# Patient Record
Sex: FEMALE | Race: WHITE | NOT HISPANIC OR LATINO | Employment: FULL TIME | URBAN - METROPOLITAN AREA
[De-identification: names, ages, dates, MRNs, and addresses within clinical notes are randomized per-mention and may not be internally consistent; named-entity substitution may affect disease eponyms.]

---

## 2019-07-11 ENCOUNTER — OFFICE VISIT (OUTPATIENT)
Dept: PODIATRY | Facility: CLINIC | Age: 49
End: 2019-07-11
Payer: COMMERCIAL

## 2019-07-11 ENCOUNTER — APPOINTMENT (OUTPATIENT)
Dept: RADIOLOGY | Facility: CLINIC | Age: 49
End: 2019-07-11
Payer: COMMERCIAL

## 2019-07-11 VITALS
WEIGHT: 105 LBS | BODY MASS INDEX: 17.93 KG/M2 | SYSTOLIC BLOOD PRESSURE: 110 MMHG | HEIGHT: 64 IN | DIASTOLIC BLOOD PRESSURE: 64 MMHG

## 2019-07-11 DIAGNOSIS — M20.12 ACQUIRED HALLUX VALGUS OF LEFT FOOT: Primary | ICD-10-CM

## 2019-07-11 DIAGNOSIS — M20.12 HALLUX VALGUS (ACQUIRED), LEFT FOOT: Primary | ICD-10-CM

## 2019-07-11 DIAGNOSIS — M20.22 HALLUX RIGIDUS OF LEFT FOOT: ICD-10-CM

## 2019-07-11 DIAGNOSIS — M20.12 HALLUX VALGUS (ACQUIRED), LEFT FOOT: ICD-10-CM

## 2019-07-11 DIAGNOSIS — M77.42 METATARSALGIA OF LEFT FOOT: ICD-10-CM

## 2019-07-11 DIAGNOSIS — T84.84XA PAINFUL ORTHOPAEDIC HARDWARE (HCC): ICD-10-CM

## 2019-07-11 PROCEDURE — 99203 OFFICE O/P NEW LOW 30 MIN: CPT | Performed by: PODIATRIST

## 2019-07-11 PROCEDURE — 73630 X-RAY EXAM OF FOOT: CPT

## 2019-07-11 RX ORDER — PRAMIPEXOLE DIHYDROCHLORIDE 1 MG/1
1 TABLET ORAL
COMMUNITY
Start: 2019-05-20

## 2019-07-11 RX ORDER — VARENICLINE TARTRATE
KIT 2 TIMES DAILY
COMMUNITY
Start: 2019-05-31

## 2019-07-11 RX ORDER — ALBUTEROL SULFATE 90 UG/1
1 AEROSOL, METERED RESPIRATORY (INHALATION) EVERY 4 HOURS PRN
COMMUNITY
Start: 2019-05-31

## 2019-07-11 RX ORDER — GABAPENTIN 300 MG/1
300 CAPSULE ORAL 3 TIMES DAILY
COMMUNITY
Start: 2019-05-20

## 2019-07-11 RX ORDER — FLUCONAZOLE 150 MG/1
TABLET ORAL
COMMUNITY
Start: 2019-05-09 | End: 2019-07-23

## 2019-07-11 NOTE — PROGRESS NOTES
Assessment/Plan:       Diagnoses and all orders for this visit:    Hallux valgus (acquired), left foot  -     X-ray foot left 3+ views; Future    Metatarsalgia of left foot  -     X-ray foot left 3+ views; Future    Other orders  -     albuterol (PROVENTIL HFA,VENTOLIN HFA) 90 mcg/act inhaler  -     fluconazole (DIFLUCAN) 150 mg tablet  -     gabapentin (NEURONTIN) 300 mg capsule  -     pramipexole (MIRAPEX) 1 mg tablet  -     CHANTIX STARTING MONTH ABDIRASHID 0 5 MG X 11 & 1 MG X 42 tablet      Procedures:  1  Fusion 1st TMTJ  2  Removal hardware 5th metatarsal  3  Removal hardware great toe    Patient has chronic left foot pain due to failed bunionectomy many years ago  At this point the great toe is end-stage arthritis and the only realistic option for pain relief would be fusion  I reviewed the patient's x-rays with her showing her this deformity and she is in agreement to proceed with fusion of the 1st metatarsophalangeal joint of the great toe  I will be able to correct the angular deformity of the great toe at this time with the fusion  She does have 2 screws  Retained in her proximal phalanx of her great toe  These will need to be removed at the time of fusion  She also has retained wire on her 5th metatarsal from a bunionette procedure which is irritating and painful  This can be removed at the same time as her procedure  Consent was signed today for fusion of the 1st metatarsophalangeal joint with removal of hardware from the great toe and 5th metatarsal   Alternatives, risks, complications were discussed  All questions were answered  No guarantees were given outcome  Patient will be sent for preop testing clearance  Subjective:      Patient ID: Rick Adjutant is a 51 y o  female  PAtient had previous bunion and bunionette surgery and the pain never resolved  The big toe doesn't bend and is "killing me " She gets shooting pain also on the lateral forefoot  Her great toe is still crooked  She can barely make it through the day  She has tried orthotics without relief  The following portions of the patient's history were reviewed and updated as appropriate: allergies, current medications, past family history, past medical history, past social history, past surgical history and problem list     Review of Systems   Constitutional: Negative for fever  Respiratory: Negative for cough and shortness of breath  Gastrointestinal: Negative for diarrhea and nausea  Musculoskeletal: Positive for arthralgias, gait problem and myalgias  Skin: Negative for wound  Neurological: Negative for numbness  Objective:      /64   Ht 5' 4" (1 626 m)   Wt 47 6 kg (105 lb)   BMI 18 02 kg/m²          Physical Exam   Cardiovascular:   Pulses:       Dorsalis pedis pulses are 2+ on the right side, and 2+ on the left side  Posterior tibial pulses are 2+ on the right side, and 2+ on the left side  Musculoskeletal:        Feet:    Feet:   Right Foot:   Protective Sensation: 10 sites tested  10 sites sensed  Skin Integrity: Negative for ulcer, erythema, warmth or dry skin  Left Foot:   Protective Sensation: 10 sites tested  10 sites sensed  Skin Integrity: Negative for ulcer, erythema, warmth or dry skin           Severe hallux rigidus with hav    PAin over 5th metatarsal with palpable hardware on neck of met

## 2019-07-22 ENCOUNTER — ANESTHESIA EVENT (OUTPATIENT)
Dept: PERIOP | Facility: AMBULARY SURGERY CENTER | Age: 49
End: 2019-07-22
Payer: COMMERCIAL

## 2019-07-23 RX ORDER — DIPHENOXYLATE HYDROCHLORIDE AND ATROPINE SULFATE 2.5; .025 MG/1; MG/1
1 TABLET ORAL DAILY
COMMUNITY

## 2019-07-23 NOTE — PRE-PROCEDURE INSTRUCTIONS
Pre-Surgery Instructions:   Medication Instructions    albuterol (PROVENTIL HFA,VENTOLIN HFA) 90 mcg/act inhaler Instructed patient per Anesthesia Guidelines   CHANTIX STARTING MONTH ABDIRASHID 0 5 MG X 11 & 1 MG X 42 tablet Instructed patient per Anesthesia Guidelines   gabapentin (NEURONTIN) 300 mg capsule Instructed patient per Anesthesia Guidelines   multivitamin (THERAGRAN) TABS Instructed patient per Anesthesia Guidelines   pramipexole (MIRAPEX) 1 mg tablet Instructed patient per Anesthesia Guidelines      Pre op and showering instructions reviewed-Patient getting hibiclens

## 2019-08-01 ENCOUNTER — APPOINTMENT (OUTPATIENT)
Dept: RADIOLOGY | Facility: AMBULARY SURGERY CENTER | Age: 49
End: 2019-08-01
Payer: COMMERCIAL

## 2019-08-01 ENCOUNTER — ANESTHESIA (OUTPATIENT)
Dept: PERIOP | Facility: AMBULARY SURGERY CENTER | Age: 49
End: 2019-08-01
Payer: COMMERCIAL

## 2019-08-01 ENCOUNTER — HOSPITAL ENCOUNTER (OUTPATIENT)
Facility: AMBULARY SURGERY CENTER | Age: 49
Setting detail: OUTPATIENT SURGERY
Discharge: HOME | End: 2019-08-01
Attending: PODIATRIST | Admitting: PODIATRIST
Payer: COMMERCIAL

## 2019-08-01 VITALS
HEART RATE: 80 BPM | BODY MASS INDEX: 17.48 KG/M2 | WEIGHT: 102.38 LBS | OXYGEN SATURATION: 98 % | SYSTOLIC BLOOD PRESSURE: 160 MMHG | TEMPERATURE: 98.5 F | HEIGHT: 64 IN | DIASTOLIC BLOOD PRESSURE: 94 MMHG | RESPIRATION RATE: 20 BRPM

## 2019-08-01 DIAGNOSIS — Z98.890 POST-OPERATIVE STATE: Primary | ICD-10-CM

## 2019-08-01 PROCEDURE — 73620 X-RAY EXAM OF FOOT: CPT

## 2019-08-01 PROCEDURE — 20680 REMOVAL OF IMPLANT DEEP: CPT | Performed by: PODIATRIST

## 2019-08-01 PROCEDURE — C1769 GUIDE WIRE: HCPCS | Performed by: PODIATRIST

## 2019-08-01 PROCEDURE — 28750 FUSION OF BIG TOE JOINT: CPT | Performed by: PODIATRIST

## 2019-08-01 PROCEDURE — C1713 ANCHOR/SCREW BN/BN,TIS/BN: HCPCS | Performed by: PODIATRIST

## 2019-08-01 DEVICE — IMPLANTABLE DEVICE: Type: IMPLANTABLE DEVICE | Site: TOE | Status: FUNCTIONAL

## 2019-08-01 RX ORDER — FENTANYL CITRATE/PF 50 MCG/ML
50 SYRINGE (ML) INJECTION
Status: COMPLETED | OUTPATIENT
Start: 2019-08-01 | End: 2019-08-01

## 2019-08-01 RX ORDER — ONDANSETRON 2 MG/ML
INJECTION INTRAMUSCULAR; INTRAVENOUS AS NEEDED
Status: DISCONTINUED | OUTPATIENT
Start: 2019-08-01 | End: 2019-08-01 | Stop reason: SURG

## 2019-08-01 RX ORDER — SODIUM CHLORIDE, SODIUM LACTATE, POTASSIUM CHLORIDE, CALCIUM CHLORIDE 600; 310; 30; 20 MG/100ML; MG/100ML; MG/100ML; MG/100ML
125 INJECTION, SOLUTION INTRAVENOUS CONTINUOUS
Status: DISCONTINUED | OUTPATIENT
Start: 2019-08-01 | End: 2019-08-01 | Stop reason: HOSPADM

## 2019-08-01 RX ORDER — HYDROMORPHONE HCL/PF 1 MG/ML
0.5 SYRINGE (ML) INJECTION
Status: DISCONTINUED | OUTPATIENT
Start: 2019-08-01 | End: 2019-08-01 | Stop reason: HOSPADM

## 2019-08-01 RX ORDER — HYDROCODONE BITARTRATE AND ACETAMINOPHEN 5; 325 MG/1; MG/1
1 TABLET ORAL EVERY 6 HOURS PRN
Status: DISCONTINUED | OUTPATIENT
Start: 2019-08-01 | End: 2019-08-01 | Stop reason: HOSPADM

## 2019-08-01 RX ORDER — MIDAZOLAM HYDROCHLORIDE 1 MG/ML
INJECTION INTRAMUSCULAR; INTRAVENOUS AS NEEDED
Status: DISCONTINUED | OUTPATIENT
Start: 2019-08-01 | End: 2019-08-01 | Stop reason: SURG

## 2019-08-01 RX ORDER — PROPOFOL 10 MG/ML
INJECTION, EMULSION INTRAVENOUS CONTINUOUS PRN
Status: DISCONTINUED | OUTPATIENT
Start: 2019-08-01 | End: 2019-08-01 | Stop reason: SURG

## 2019-08-01 RX ORDER — GABAPENTIN 300 MG/1
300 CAPSULE ORAL ONCE
Status: COMPLETED | OUTPATIENT
Start: 2019-08-01 | End: 2019-08-01

## 2019-08-01 RX ORDER — SCOLOPAMINE TRANSDERMAL SYSTEM 1 MG/1
PATCH, EXTENDED RELEASE TRANSDERMAL AS NEEDED
Status: DISCONTINUED | OUTPATIENT
Start: 2019-08-01 | End: 2019-08-01 | Stop reason: SURG

## 2019-08-01 RX ORDER — PROMETHAZINE HYDROCHLORIDE 25 MG/ML
25 INJECTION, SOLUTION INTRAMUSCULAR; INTRAVENOUS ONCE AS NEEDED
Status: CANCELLED | OUTPATIENT
Start: 2019-08-01

## 2019-08-01 RX ORDER — ONDANSETRON 2 MG/ML
4 INJECTION INTRAMUSCULAR; INTRAVENOUS ONCE AS NEEDED
Status: COMPLETED | OUTPATIENT
Start: 2019-08-01 | End: 2019-08-01

## 2019-08-01 RX ORDER — PROPOFOL 10 MG/ML
INJECTION, EMULSION INTRAVENOUS AS NEEDED
Status: DISCONTINUED | OUTPATIENT
Start: 2019-08-01 | End: 2019-08-01 | Stop reason: SURG

## 2019-08-01 RX ORDER — MAGNESIUM HYDROXIDE 1200 MG/15ML
LIQUID ORAL AS NEEDED
Status: DISCONTINUED | OUTPATIENT
Start: 2019-08-01 | End: 2019-08-01 | Stop reason: HOSPADM

## 2019-08-01 RX ORDER — LIDOCAINE HYDROCHLORIDE 10 MG/ML
0.5 INJECTION, SOLUTION EPIDURAL; INFILTRATION; INTRACAUDAL; PERINEURAL ONCE AS NEEDED
Status: DISCONTINUED | OUTPATIENT
Start: 2019-08-01 | End: 2019-08-01 | Stop reason: HOSPADM

## 2019-08-01 RX ORDER — OXYCODONE HYDROCHLORIDE AND ACETAMINOPHEN 5; 325 MG/1; MG/1
1 TABLET ORAL EVERY 4 HOURS PRN
Status: DISCONTINUED | OUTPATIENT
Start: 2019-08-01 | End: 2019-08-01

## 2019-08-01 RX ORDER — FENTANYL CITRATE 50 UG/ML
INJECTION, SOLUTION INTRAMUSCULAR; INTRAVENOUS AS NEEDED
Status: DISCONTINUED | OUTPATIENT
Start: 2019-08-01 | End: 2019-08-01 | Stop reason: SURG

## 2019-08-01 RX ORDER — CLINDAMYCIN PHOSPHATE 900 MG/50ML
900 INJECTION INTRAVENOUS ONCE
Status: COMPLETED | OUTPATIENT
Start: 2019-08-01 | End: 2019-08-01

## 2019-08-01 RX ORDER — SODIUM CHLORIDE, SODIUM LACTATE, POTASSIUM CHLORIDE, CALCIUM CHLORIDE 600; 310; 30; 20 MG/100ML; MG/100ML; MG/100ML; MG/100ML
INJECTION, SOLUTION INTRAVENOUS CONTINUOUS PRN
Status: DISCONTINUED | OUTPATIENT
Start: 2019-08-01 | End: 2019-08-01

## 2019-08-01 RX ORDER — KETAMINE HYDROCHLORIDE 50 MG/ML
INJECTION, SOLUTION, CONCENTRATE INTRAMUSCULAR; INTRAVENOUS AS NEEDED
Status: DISCONTINUED | OUTPATIENT
Start: 2019-08-01 | End: 2019-08-01 | Stop reason: SURG

## 2019-08-01 RX ORDER — DEXAMETHASONE SODIUM PHOSPHATE 4 MG/ML
INJECTION, SOLUTION INTRA-ARTICULAR; INTRALESIONAL; INTRAMUSCULAR; INTRAVENOUS; SOFT TISSUE AS NEEDED
Status: DISCONTINUED | OUTPATIENT
Start: 2019-08-01 | End: 2019-08-01 | Stop reason: SURG

## 2019-08-01 RX ORDER — SODIUM CHLORIDE, SODIUM LACTATE, POTASSIUM CHLORIDE, CALCIUM CHLORIDE 600; 310; 30; 20 MG/100ML; MG/100ML; MG/100ML; MG/100ML
100 INJECTION, SOLUTION INTRAVENOUS CONTINUOUS
Status: CANCELLED | OUTPATIENT
Start: 2019-08-01

## 2019-08-01 RX ORDER — DIPHENHYDRAMINE HYDROCHLORIDE 50 MG/ML
12.5 INJECTION INTRAMUSCULAR; INTRAVENOUS ONCE AS NEEDED
Status: DISCONTINUED | OUTPATIENT
Start: 2019-08-01 | End: 2019-08-01 | Stop reason: HOSPADM

## 2019-08-01 RX ORDER — DIPHENHYDRAMINE HYDROCHLORIDE 50 MG/ML
INJECTION INTRAMUSCULAR; INTRAVENOUS AS NEEDED
Status: DISCONTINUED | OUTPATIENT
Start: 2019-08-01 | End: 2019-08-01 | Stop reason: SURG

## 2019-08-01 RX ORDER — OXYCODONE HYDROCHLORIDE AND ACETAMINOPHEN 5; 325 MG/1; MG/1
1 TABLET ORAL EVERY 4 HOURS PRN
Qty: 20 TABLET | Refills: 0 | Status: SHIPPED | OUTPATIENT
Start: 2019-08-01 | End: 2019-08-11

## 2019-08-01 RX ADMIN — KETAMINE HYDROCHLORIDE 10 MG: 50 INJECTION INTRAMUSCULAR; INTRAVENOUS at 15:15

## 2019-08-01 RX ADMIN — PHENYLEPHRINE HYDROCHLORIDE 50 MCG: 10 INJECTION INTRAVENOUS at 15:25

## 2019-08-01 RX ADMIN — KETAMINE HYDROCHLORIDE 25 MG: 50 INJECTION INTRAMUSCULAR; INTRAVENOUS at 14:10

## 2019-08-01 RX ADMIN — DIPHENHYDRAMINE HYDROCHLORIDE 12.5 MG: 50 INJECTION, SOLUTION INTRAMUSCULAR; INTRAVENOUS at 14:21

## 2019-08-01 RX ADMIN — HYDROMORPHONE HYDROCHLORIDE 0.5 MG: 1 INJECTION, SOLUTION INTRAMUSCULAR; INTRAVENOUS; SUBCUTANEOUS at 16:27

## 2019-08-01 RX ADMIN — SODIUM CHLORIDE, SODIUM LACTATE, POTASSIUM CHLORIDE, AND CALCIUM CHLORIDE: .6; .31; .03; .02 INJECTION, SOLUTION INTRAVENOUS at 14:50

## 2019-08-01 RX ADMIN — PHENYLEPHRINE HYDROCHLORIDE 100 MCG: 10 INJECTION INTRAVENOUS at 15:03

## 2019-08-01 RX ADMIN — FENTANYL CITRATE 50 MCG: 50 INJECTION, SOLUTION INTRAMUSCULAR; INTRAVENOUS at 16:07

## 2019-08-01 RX ADMIN — HYDROCODONE BITARTRATE AND ACETAMINOPHEN 1 TABLET: 5; 325 TABLET ORAL at 16:50

## 2019-08-01 RX ADMIN — PHENYLEPHRINE HYDROCHLORIDE 100 MCG: 10 INJECTION INTRAVENOUS at 15:16

## 2019-08-01 RX ADMIN — HYDROMORPHONE HYDROCHLORIDE 0.5 MG: 1 INJECTION, SOLUTION INTRAMUSCULAR; INTRAVENOUS; SUBCUTANEOUS at 16:20

## 2019-08-01 RX ADMIN — FENTANYL CITRATE 50 MCG: 50 INJECTION, SOLUTION INTRAMUSCULAR; INTRAVENOUS at 16:15

## 2019-08-01 RX ADMIN — ONDANSETRON 4 MG: 2 INJECTION INTRAMUSCULAR; INTRAVENOUS at 14:19

## 2019-08-01 RX ADMIN — DEXAMETHASONE SODIUM PHOSPHATE 4 MG: 4 INJECTION, SOLUTION INTRAMUSCULAR; INTRAVENOUS at 14:21

## 2019-08-01 RX ADMIN — ONDANSETRON 4 MG: 2 INJECTION INTRAMUSCULAR; INTRAVENOUS at 16:10

## 2019-08-01 RX ADMIN — PHENYLEPHRINE HYDROCHLORIDE 100 MCG: 10 INJECTION INTRAVENOUS at 15:05

## 2019-08-01 RX ADMIN — GABAPENTIN 300 MG: 300 CAPSULE ORAL at 12:44

## 2019-08-01 RX ADMIN — PROPOFOL 150 MCG/KG/MIN: 10 INJECTION, EMULSION INTRAVENOUS at 14:11

## 2019-08-01 RX ADMIN — SCOPALAMINE 1 PATCH: 1 PATCH, EXTENDED RELEASE TRANSDERMAL at 12:41

## 2019-08-01 RX ADMIN — CLINDAMYCIN PHOSPHATE 900 MG: 18 INJECTION, SOLUTION INTRAMUSCULAR; INTRAVENOUS at 14:11

## 2019-08-01 RX ADMIN — FENTANYL CITRATE 25 MCG: 50 INJECTION, SOLUTION INTRAMUSCULAR; INTRAVENOUS at 14:40

## 2019-08-01 RX ADMIN — MIDAZOLAM HYDROCHLORIDE 2 MG: 1 INJECTION, SOLUTION INTRAMUSCULAR; INTRAVENOUS at 14:03

## 2019-08-01 RX ADMIN — KETAMINE HYDROCHLORIDE 5 MG: 50 INJECTION INTRAMUSCULAR; INTRAVENOUS at 14:57

## 2019-08-01 RX ADMIN — KETAMINE HYDROCHLORIDE 10 MG: 50 INJECTION INTRAMUSCULAR; INTRAVENOUS at 14:37

## 2019-08-01 RX ADMIN — PROPOFOL 200 MG: 10 INJECTION, EMULSION INTRAVENOUS at 14:10

## 2019-08-01 RX ADMIN — SODIUM CHLORIDE, SODIUM LACTATE, POTASSIUM CHLORIDE, AND CALCIUM CHLORIDE: .6; .31; .03; .02 INJECTION, SOLUTION INTRAVENOUS at 12:15

## 2019-08-01 RX ADMIN — PHENYLEPHRINE HYDROCHLORIDE 100 MCG: 10 INJECTION INTRAVENOUS at 14:55

## 2019-08-01 NOTE — ANESTHESIA POSTPROCEDURE EVALUATION
Post-Op Assessment Note    CV Status:  Stable  Pain Score: 0    Pain management: adequate     Mental Status:  Sleepy   Hydration Status:  Stable   PONV Controlled:  Controlled   Airway Patency:  Patent   Post Op Vitals Reviewed: Yes      Staff: CRNA           BP   152/99   Temp   97 9   Pulse  74   Resp   24   SpO2   100

## 2019-08-01 NOTE — ANESTHESIA PREPROCEDURE EVALUATION
Review of Systems/Medical History  Patient summary reviewed  Chart reviewed  History of anesthetic complications (motion sickness) PONV    Cardiovascular  Negative cardio ROS Exercise tolerance (METS): >4,     Pulmonary  Smoker (quit x 1 month) ex-smoker  ,        GI/Hepatic  Negative GI/hepatic ROS          Negative  ROS        Endo/Other  Negative endo/other ROS      GYN    Hysterectomy,        Hematology  Negative hematology ROS      Musculoskeletal    Comment: S/p cervical fusion      Neurology  Negative neurology ROS      Psychology     Chronic pain (RSD LLE - managed with gabapentin, NSAIDs  Weaned herself off opiates 2 years ago),          Physical Exam    Airway    Mallampati score: I  TM Distance: >3 FB  Neck ROM: full     Dental   No notable dental hx     Cardiovascular  Comment: Negative ROS,     Pulmonary      Other Findings    No recent labs    Anesthesia Plan  ASA Score- 2     Anesthesia Type- general with ASA Monitors  Additional Monitors:   Airway Plan: LMA  Comment: Ankle block by surgeon  TIVA for hx severe PONV  Plan Factors-    Induction- intravenous  Postoperative Plan-     Informed Consent- Anesthetic plan and risks discussed with patient and daughter  I personally reviewed this patient with the CRNA  Discussed and agreed on the Anesthesia Plan with the CRNA  Becky Ghosh

## 2019-08-01 NOTE — OP NOTE
OPERATIVE REPORT  PATIENT NAME: Brianne Oliveira    :  1970  MRN: [id number]  Pt Location: [facility] OR [facility]OM 04    SURGERY DATE: 2019    Surgeon(s) and Role:     * Catrachito Kaur DPM - Primary     * Mimi Avalos DPM - Assisting    Preop Diagnosis:  Hallux valgus (acquired), left foot [M20 12]  Painful orthopaedic hardware ([address] ) [T84 84XA]  Hallux rigidus of left foot [M20 22]    Post-Op Diagnosis Codes:     * Hallux valgus (acquired), left foot [M20 12]     * Painful orthopaedic hardware ([address] ) [T84 84XA]     * Hallux rigidus of left foot [M20 22]    PROCEDURE:  1  Arthrodesis Fusion of 1st metatarsophalangeal joint left foot  2  Removal of hardware from phalanx of great toe    Specimen(s):  * No specimens in log *    Estimated Blood Loss:   Minimal    Drains:  * No LDAs found *    Anesthesia Type:   General/LMA with local    Operative Indications:  Hallux valgus (acquired), left foot [M20 12]  Painful orthopaedic hardware ([address] ) [T84 84XA]  Hallux rigidus of left foot [M20 22]    Materials:  Arthrex 20 x 15 mm staple  Arthrex partially-threaded cortical screw  Vicryl and nylon suture    Operative Findings:  Consistent with diagnosis  There is a retained wire in the 5th metatarsal that was buried under extensive amount of bone scarring  In order to remove this piece of wire we would had to take out a significant amount of the metatarsal bone  It was decided at that time that it was unnecessary to remove any incision was closed  As far as the 1st ray both screws that were retained were removed in entirety  The 1st MPJ was fused with good fixation  Complications:   None    Procedure and Technique:  Under mild sedation the patient was brought to the operating room and placed on the operating table in a supine position  A pneumatic ankle tourniquet was placed about the left ankle  Following IV sedation a time-out was performed    Local anesthetic was infiltrated in a 5th ray and 1st ray block about the left foot  The foot was then scrubbed,, draped in the usual aseptic manner  An Esmarch was used to exsanguinate the left foot the tourniquet was inflated to 250 mm of pressure  Attention was directed to the 1st ray where the patient had extensive bony spurring with limited range of motion at the 1st metatarsophalangeal joint  A 6 cm curvilinear incision was made medial to the extensor hallucis longus tendon centered over the 1st metatarsophalangeal joint  Sharp and blunt dissection continued down to the level the capsule which was opened in a linear fashion  All soft tissue capsular attachments reflected off the great toe joint  The retained screws were noted in the medial proximal phalanx  There were removed  Next the 1st metatarsophalangeal joint was inspected  There was no cartilage on either the phalanx and metatarsal head  There was extensive spurring and joint mice  All rough edges were smoothed with a rongeur  Then using a cup and ball Reamer the 1st met head and base of proximal phalanx were prepped  The toe was then put and in corrected position for fusion and a K-wire was placed from the proximal phalanx and the 1st metatarsal   Alignment was confirmed on x-ray  A cortical screw was then placed over the K-wire with good compression noted across the fusion site  Next a 20 x 18 mm Arthrex staple was placed from the phalanx laterally to the metatarsal head medially  Again excellent compression was noted with the staple  C-arm pictures were taken to ensure good alignment and hardware placement  The area was copiously rinsed with normal sterile saline  Deep closure 2 0 Vicryl  Superficial closed with 3 0 Vicryl  Skin was repaired with 4 O nylon  Attention was then directed to the 5th metatarsal neck where retained wire was noted    A 2 cm linear incision was made over this area with dissection carried down to the level of the metatarsal   There was a small amount of visible wire however most of the wire was completely buried within scarred cortical bone  I was unable to remove the wire without extensive removal of metatarsal bone  This time is decided it would cause more trauma to remove the wire rather than just leave it  The area was flushed  Deep closure with 3 0 Vicryl  The skin was repaired with 4 O nylon  Final x-rays were taken  The foot was then cleaned and dressed with Adaptic, 4 x 4 gauze, rolled gauze  The tourniquet was deflated  A prompt hyperemic response was noted to all digits  The patient emerged from anesthesia having tolerated the procedure well  She was transferred to PACU with vital signs stable       I was present for the entire procedure    Patient Disposition:  PACU  and hemodynamically stable    SIGNATURE: Merlin Kenyon DPM  DATE: August 1, 2019  TIME: 3:46 PM

## 2019-08-01 NOTE — OP NOTE
OPERATIVE REPORT - Podiatry  PATIENT NAME: Leticia Isabel    :  1970  MRN: [id number]  Pt Location: [facility]    SURGERY DATE: 2019    Surgeon(s) and Role:     * Chandan Malik DPM - Primary     * Paramjit Fitzgerald DPM - Assisting    Pre-op Diagnosis:  Hallux valgus (acquired), left foot [M20 12]  Painful orthopaedic hardware ([address] ) [T84 84XA]  Hallux rigidus of left foot [M20 22]    Post-Op Diagnosis Codes:     * Hallux valgus (acquired), left foot [M20 12]     * Painful orthopaedic hardware ([address] ) [T84 84XA]     * Hallux rigidus of left foot [M20 22]    Procedure(s) (LRB):  1  LEFT FOOT 1ST MTPJ ARTHRODESIS / FUSION: 69365 (CPT®)  2  LEFT FOOT GREAT TOE REMOVAL OF HARDWARE: 75866 (CPT®)    Specimen(s):  * No specimens in log *    Estimated Blood Loss:   Minimal    Drains:  * No LDAs found *    Anesthesia Type:   General/LMA with a total of 20 cc of 1% lidocaine plain, and 0 5% bupivacaine plain    Hemostasis:  Left ankle pneumatic tourniquet 250 mm per Hg for 80 minutes  Materials:  1x Arthrex 22 mm partially threaded cannulated compression screw  1x Arthrex compression staple  3-0 Vicryl  4-0 Vicryl  4-0 nylon    Operative Findings:  Consistent with diagnosis  Cerclage wire on the 5th metatarsal shaft was unable to be removed after multiple attempts  Complications:   None    Procedure and Technique:     Under mild sedation, the patient was brought into the operating room and placed on the operating room table in the supine position  A pneumatic tourniquet was then placed around the patient's left lower extremity with ample webril padding  A time out was performed to confirm the correct patient, procedure and site with all parties in agreement  Following IV sedation, a Perez, and 5th digit block block was performed consisting of 20 ml of 1% Lidocaine and 0 5% Bupivacaine in a 1:1 mixture  The foot was then scrubbed, prepped and draped in the usual aseptic manner   An esmarch bandage was utilized to exsangunate the patients foot and the tourniquet was then inflated  The esmarch bandage was removed and the foot was placed on the operating room table  Attention was then directed to dorsomedial aspect of the Left foot where an approximately 6cm dorsolinear incision was made  This incision was deepened to subcutaneous tissue with a sterile 15 blade  All vital neurovascular structures were identified and then retracted, all bleeders were identified and cauterized  The EHL was then identified and retracted laterally  A capsular incision was then made and all capsular and periosteal structures were reflected off of the 1st metatarsal head and the base of the proximal phalanx, all soft tissue attachments were reflected off the 1st metatarsal head and base of the proximal phalanx  Upon visualization of the MTPJ, it was noted that there was cartilage destruction to head of 1st metatarsal and prominent osteophyte formation circumferentially around the joint  2x fully threaded screws were removed from the base and shaft of the proximal phalanx and passed off the operative table  This was confirmed utilizing C-arm  A cup and cone reamer was then utilized to remove all cartilaginous surfaces from the head of the first metatarsal and the base of the proximal phalanx down to bleeding subchondral bone in the standard fashion  Adequate reduction and position of the joint was visualized and assured using C-arm  Using standard AO technique a lag screw was placed from distal medial aspect on the proximal phalanx to proximal lateral on the first metatarsal head to achieve compression  The length was confirmed on C-arm  An [employer] compression staple was then placed on the dorsal aspect of the 1st MPJ per manufacture protocol  Adequate reduction of the joint was confirmed radiologically and clinically  The wound was cleansed with copious amounts of sterile saline   Capsular closure was then obtained utilizing 2-0 Vicryl  Subcutaneous tissues were then reapproximated utilizing 3-0 Vicryl  Skin closure was then obtained utilizing 4-0 nylon  Attention was then directed to the 5th metatarsal head where an approximately 2 cm dorsal linear incision was made by the 5th metatarsal neck  The cerclage wire was confirmed utilizing C-arm  However after multiple attempts the cerclage wire was unable to be removed  It was at this time that it was decided to leave the cerclage wire in place  The wound was cleansed with copious amounts of sterile saline  Capsular closure was then obtained utilizing 2-0 Vicryl  Subcutaneous tissues were then reapproximated utilizing 3-0 Vicryl  Skin closure was then obtained utilizing 4-0 nylon  The foot was then cleansed and dried  The incision site was dressed with Xeroform, dry sterile dressing  This was then covered with a Kerlix and an ACE wrap  The tourniquet was deflated and normal hyperemic flush was noted to all digits  The patient tolerated the procedure and anesthesia well and was transported to the PACU with vital signs stable  Dr Luiz Torrez was present during the entire procedure and participated in all key aspects  SIGNATURE: Samuel Cowart DPM  DATE: August 1, 2019  TIME: 3:44 PM      Portions of the record may have been created with voice recognition software  Occasional wrong word or "sound a like" substitutions may have occurred due to the inherent limitations of voice recognition software  Read the chart carefully and recognize, using context, where substitutions have occurred

## 2019-08-01 NOTE — DISCHARGE SUMMARY
Discharge Summary Outpatient Procedure Podiatry -   Lynann Alpers 51 y o  female MRN: [id number]  Unit/Bed#: OR POOL Encounter: [id number]    Admission Date: 8/1/2019     Admitting Diagnosis: Hallux valgus (acquired), left foot [M20 12]  Painful orthopaedic hardware [facility]) [T84 84XA]  Hallux rigidus of left foot [M20 22]    Discharge Diagnosis: same    Procedures Performed:   1  LEFT FOOT 1ST MTPJ ARTHRODESIS / FUSION: 99649 (CPT®)  2  LEFT FOOT GREAT TOE REMOVAL OF HARDWARE: 69507 (CPT®)    Complications: none    Condition at Discharge: stable    Discharge instructions/Information to patient and family:   See after visit summary for information provided to patient and family  Provisions for Follow-Up Care/Important appointments:  See after visit summary for information related to follow-up care and any pertinent home health orders  Discharge Medications:  See after visit summary for reconciled discharge medications provided to patient and family

## 2019-08-01 NOTE — PROGRESS NOTES
Consulted w/ dr gee, anesthesia, pt states pain tolerable a a 5 , prefers discharge from recovery & being with family & taking oral pain medication

## 2019-08-06 ENCOUNTER — OFFICE VISIT (OUTPATIENT)
Dept: PODIATRY | Facility: CLINIC | Age: 49
End: 2019-08-06

## 2019-08-06 VITALS
HEIGHT: 64 IN | SYSTOLIC BLOOD PRESSURE: 150 MMHG | WEIGHT: 102 LBS | BODY MASS INDEX: 17.42 KG/M2 | DIASTOLIC BLOOD PRESSURE: 84 MMHG

## 2019-08-06 DIAGNOSIS — T84.84XA PAINFUL ORTHOPAEDIC HARDWARE (HCC): Primary | ICD-10-CM

## 2019-08-06 DIAGNOSIS — M20.22 HALLUX RIGIDUS OF LEFT FOOT: ICD-10-CM

## 2019-08-06 PROCEDURE — 99024 POSTOP FOLLOW-UP VISIT: CPT | Performed by: PODIATRIST

## 2019-08-10 ENCOUNTER — HOSPITAL ENCOUNTER (EMERGENCY)
Facility: HOSPITAL | Age: 49
Discharge: HOME | End: 2019-08-10
Attending: EMERGENCY MEDICINE | Admitting: EMERGENCY MEDICINE
Payer: COMMERCIAL

## 2019-08-10 VITALS
OXYGEN SATURATION: 100 % | TEMPERATURE: 99.4 F | HEIGHT: 64 IN | DIASTOLIC BLOOD PRESSURE: 79 MMHG | RESPIRATION RATE: 15 BRPM | HEART RATE: 74 BPM | SYSTOLIC BLOOD PRESSURE: 127 MMHG | BODY MASS INDEX: 17.75 KG/M2 | WEIGHT: 104 LBS

## 2019-08-10 DIAGNOSIS — T81.40XA POST-OPERATIVE INFECTION: Primary | ICD-10-CM

## 2019-08-10 PROCEDURE — 99283 EMERGENCY DEPT VISIT LOW MDM: CPT

## 2019-08-10 PROCEDURE — 99284 EMERGENCY DEPT VISIT MOD MDM: CPT | Performed by: PHYSICIAN ASSISTANT

## 2019-08-10 NOTE — ED PROVIDER NOTES
History  Chief Complaint   Patient presents with    Foot Injury     Pt states that she had bunionectomy   she changed her dressing yesterday and is concerned that her toe is stinging, hurting and oozing  Negative fevers     50 yo female presents to the Emergency Department for evaluation of L foot pain and discharge  She is 9d s/p bunionectomy, performed by [facility] Dr Elizabeth Cluster  States that over the past 1-2 days the site became increasingly painful, whereas post operative pain had previously resolved  Noted scant white discharge on dressing this AM  No fevers, chills or sweats  Non ambulatory secondary to procedure  Prior to Admission Medications   Prescriptions Last Dose Informant Patient Reported? Taking?    CHANTIX STARTING MONTH ABDIRASHID 0 5 MG X 11 & 1 MG X 42 tablet   Yes No   Sig: Take by mouth 2 (two) times a day    albuterol (PROVENTIL HFA,VENTOLIN HFA) 90 mcg/act inhaler   Yes No   Sig: Inhale 1 puff every 4 (four) hours as needed    gabapentin (NEURONTIN) 300 mg capsule   Yes No   Sig: Take 300 mg by mouth 3 (three) times a day    multivitamin (THERAGRAN) TABS   Yes No   Sig: Take 1 tablet by mouth daily   oxyCODONE-acetaminophen (PERCOCET) 5-325 mg per tablet Not Taking at Unknown time  No No   Sig: Take 1 tablet by mouth every 4 (four) hours as needed for moderate pain for up to 10 daysMax Daily Amount: 6 tablets   Patient not taking: Reported on 8/10/2019   pramipexole (MIRAPEX) 1 mg tablet   Yes No   Sig: Take 1 mg by mouth daily at bedtime       Facility-Administered Medications: None       Past Medical History:   Diagnosis Date    Bronchitis     Neuropathy     PONV (postoperative nausea and vomiting)     Restless leg syndrome        Past Surgical History:   Procedure Laterality Date    BACK SURGERY      Lumbar X2    CERVICAL FUSION       SECTION      X1    CHOLECYSTECTOMY      COLONOSCOPY      CYST REMOVAL      Scalp, lorna middle fingers    FOOT SURGERY Left     X2    HYSTERECTOMY      NH FUSION BIG TOE,MT-P JT Left 2019    Procedure: FOOT 1ST MTPJ ARTHRODESIS / FUSION;  Surgeon: Lalo Castrejon DPM;  [facility];  Service: Podiatry    NH REMOVAL DEEP IMPLANT Left 2019    Procedure: FOOT GREAT TOE REMOVAL OF HARDWARE;  Surgeon: Lalo Castrejon DPM;  [facility];  Service: Podiatry    TONSILLECTOMY      and adeniodectomy       History reviewed  No pertinent family history  I have reviewed and agree with the history as documented  Social History     Tobacco Use    Smoking status: Former Smoker     Packs/day:      Years: 30      Pack years: 30      Last attempt to quit: 2019     Years since quittin 0    Smokeless tobacco: Never Used    Tobacco comment: Quitting-taking Chantix   Substance Use Topics    Alcohol use: Yes     Frequency: 2-4 times a month     Drinks per session: 1 or 2     Binge frequency: Never     Comment: Socially    Drug use: Never        Review of Systems   Constitutional: Negative for fever  Musculoskeletal: Positive for arthralgias  Negative for joint swelling and myalgias  Skin: Positive for color change and wound  Neurological: Negative for weakness and numbness  All other systems reviewed and are negative  Physical Exam  Physical Exam   Constitutional: She appears well-developed and well-nourished  No distress  HENT:   Head: Normocephalic and atraumatic  Eyes: Pupils are equal, round, and reactive to light  No scleral icterus  Cardiovascular: Normal rate and regular rhythm  Exam reveals no gallop and no friction rub  No murmur heard  Pulmonary/Chest: No respiratory distress  She has no wheezes  She has no rales  Musculoskeletal:        Feet:    Skin: Skin is dry  Capillary refill takes less than 2 seconds  She is not diaphoretic  Psychiatric: She has a normal mood and affect  Her behavior is normal    Vitals reviewed        Vital Signs  ED Triage Vitals   Temperature Pulse Respirations Blood Pressure SpO2   08/10/19 1445 08/10/19 1445 08/10/19 1445 08/10/19 1445 08/10/19 1445   99 4 °F (37 4 °C) 76 17 141/85 98 %      Temp Source Heart Rate Source Patient Position - Orthostatic VS BP Location FiO2 (%)   08/10/19 1445 08/10/19 1500 08/10/19 1445 08/10/19 1445 --   Temporal Monitor Sitting Right arm       Pain Score       08/10/19 1445       7           Vitals:    08/10/19 1445 08/10/19 1500   BP: 141/85 127/79   Pulse: 76 74   Patient Position - Orthostatic VS: Sitting Lying         Visual Acuity      ED Medications  Medications - No data to display    Diagnostic Studies  Results Reviewed     None                 No orders to display              Procedures  Procedures       ED Course                               MDM  Number of Diagnoses or Management Options  Post-operative infection: new and does not require workup  Diagnosis management comments: Surgical site appears relatively clean and dry, however with sudden increase in pain and mild discharge, will start abx to prevent surgical site infection       Amount and/or Complexity of Data Reviewed  Review and summarize past medical records: yes        Disposition  Final diagnoses:   Post-operative infection     Time reflects when diagnosis was documented in both MDM as applicable and the Disposition within this note     Time User Action Codes Description Comment    8/10/2019  3:07 PM Thiago Zuleta Add [T81 [id number]] Post-operative infection       ED Disposition     ED Disposition Condition Date/Time Comment    Discharge Stable Sat Aug 10, 2019  3:07 PM Jh Gill discharge to home/self care              Follow-up Information     Follow up With Specialties Details Why Contact Info    Yadira Gomez, [facility], [address]  [facility] [id number] [facility] [address]            Discharge Medication List as of 8/10/2019  3:09 PM      START taking these medications    Details dicloxacillin (DYNAPEN) 500 MG capsule Take 1 capsule (500 mg total) by mouth 3 (three) times a day for 7 days, Starting Sat 8/10/2019, Until Sat 8/17/2019, Print         CONTINUE these medications which have NOT CHANGED    Details   albuterol (PROVENTIL HFA,VENTOLIN HFA) 90 mcg/act inhaler Inhale 1 puff every 4 (four) hours as needed , Starting Fri 5/31/2019, Historical Med      CHANTIX STARTING MONTH ABDIRASHID 0 5 MG X 11 & 1 MG X 42 tablet Take by mouth 2 (two) times a day , Starting Fri 5/31/2019, Historical Med      gabapentin (NEURONTIN) 300 mg capsule Take 300 mg by mouth 3 (three) times a day , Starting Mon 5/20/2019, Historical Med      multivitamin (THERAGRAN) TABS Take 1 tablet by mouth daily, Historical Med      oxyCODONE-acetaminophen (PERCOCET) 5-325 mg per tablet Take 1 tablet by mouth every 4 (four) hours as needed for moderate pain for up to 10 daysMax Daily Amount: 6 tablets, Starting Thu 8/1/2019, Until Sun 8/11/2019, Print      pramipexole (MIRAPEX) 1 mg tablet Take 1 mg by mouth daily at bedtime , Starting Mon 5/20/2019, Historical Med           No discharge procedures on file      ED Provider  Electronically Signed by           Darlyn Solomon PA-C  08/11/19 8909

## 2019-08-15 ENCOUNTER — OFFICE VISIT (OUTPATIENT)
Dept: PODIATRY | Facility: CLINIC | Age: 49
End: 2019-08-15

## 2019-08-15 VITALS
BODY MASS INDEX: 17.75 KG/M2 | HEIGHT: 64 IN | WEIGHT: 104 LBS | DIASTOLIC BLOOD PRESSURE: 64 MMHG | SYSTOLIC BLOOD PRESSURE: 126 MMHG

## 2019-08-15 DIAGNOSIS — T81.40XA POSTOPERATIVE INFECTION, INITIAL ENCOUNTER: ICD-10-CM

## 2019-08-15 DIAGNOSIS — M20.22 HALLUX RIGIDUS OF LEFT FOOT: Primary | ICD-10-CM

## 2019-08-15 PROCEDURE — 99024 POSTOP FOLLOW-UP VISIT: CPT | Performed by: PODIATRIST

## 2019-08-15 NOTE — PROGRESS NOTES
POST-OP VISIT    Jaene Kyle  1970      Subjective: Patient here for post-op appointment following Fusion of her left great toe joint  Last Friday patient's incision started to turn red and swell  She had a low-grade fever went to the ED on Saturday  She was put on an oral antibiotic  The incision is looking better  It did cause an initial increase in pain  Overall she is feeling well  Objective: The patient appears in NAD / non-toxic  Primary dressing and splint/cast taken down for wound inspection  VSS  No signs of infection  No active drainage  Normal post-op edema  No necrosis, dehiscence  Mild superficial dehiscence but no exposed hardware or bone  No pus  No cellulitis  Assessment/Plan:     Diagnoses and all orders for this visit:    Hallux rigidus of left foot    Postoperative infection, initial encounter        1  Patient is stable post-op  2  Discussed compliance with weight bearing instructions, incision care, and rest  Call if any increase in pain, fevers, calf pain, shortness of breath, or general distress is noted  Patient instructed to go to ER if call is not returned immediately  3  Infection seems controlled  Finish antibiotic as written  Check 1 week  Sutures removed, applied steristrips  Keep incision covered  Betadyne daily to incision

## 2019-08-22 ENCOUNTER — HOSPITAL ENCOUNTER (INPATIENT)
Facility: HOSPITAL | Age: 49
LOS: 3 days | Discharge: HOME | DRG: 603 | End: 2019-08-25
Attending: PODIATRIST | Admitting: PODIATRIST
Payer: COMMERCIAL

## 2019-08-22 ENCOUNTER — APPOINTMENT (INPATIENT)
Dept: RADIOLOGY | Facility: HOSPITAL | Age: 49
DRG: 603 | End: 2019-08-22
Payer: COMMERCIAL

## 2019-08-22 ENCOUNTER — OFFICE VISIT (OUTPATIENT)
Dept: PODIATRY | Facility: CLINIC | Age: 49
End: 2019-08-22

## 2019-08-22 VITALS
HEIGHT: 64 IN | SYSTOLIC BLOOD PRESSURE: 125 MMHG | BODY MASS INDEX: 17.75 KG/M2 | WEIGHT: 104 LBS | DIASTOLIC BLOOD PRESSURE: 63 MMHG

## 2019-08-22 DIAGNOSIS — T81.31XA DISRUPTION OF EXTERNAL SURGICAL WOUND, INITIAL ENCOUNTER: ICD-10-CM

## 2019-08-22 DIAGNOSIS — L03.119 CELLULITIS AND ABSCESS OF FOOT: Primary | ICD-10-CM

## 2019-08-22 DIAGNOSIS — T81.41XS INFECTION OF SUPERFICIAL INCISIONAL SURGICAL SITE AFTER PROCEDURE, SEQUELA: ICD-10-CM

## 2019-08-22 DIAGNOSIS — R11.0 NAUSEA: ICD-10-CM

## 2019-08-22 DIAGNOSIS — Z01.818 PREOPERATIVE CLEARANCE: Primary | ICD-10-CM

## 2019-08-22 DIAGNOSIS — L02.619 CELLULITIS AND ABSCESS OF FOOT: Primary | ICD-10-CM

## 2019-08-22 PROBLEM — T81.41XA INFECTION OF SUPERFICIAL INCISIONAL SURGICAL SITE AFTER PROCEDURE: Status: ACTIVE | Noted: 2019-08-22

## 2019-08-22 LAB
ALBUMIN SERPL BCP-MCNC: 3.7 G/DL (ref 3.5–5)
ALP SERPL-CCNC: 71 U/L (ref 46–116)
ALT SERPL W P-5'-P-CCNC: 24 U/L (ref 12–78)
ANION GAP SERPL CALCULATED.3IONS-SCNC: 8 MMOL/L (ref 4–13)
AST SERPL W P-5'-P-CCNC: 30 U/L (ref 5–45)
ATRIAL RATE: 65 BPM
ATRIAL RATE: 66 BPM
BASOPHILS # BLD AUTO: 0.06 THOUSANDS/ΜL (ref 0–0.1)
BASOPHILS NFR BLD AUTO: 1 % (ref 0–1)
BILIRUB SERPL-MCNC: 0.32 MG/DL (ref 0.2–1)
BUN SERPL-MCNC: 14 MG/DL (ref 5–25)
CALCIUM SERPL-MCNC: 9.2 MG/DL (ref 8.3–10.1)
CHLORIDE SERPL-SCNC: 106 MMOL/L (ref 100–108)
CO2 SERPL-SCNC: 25 MMOL/L (ref 21–32)
CREAT SERPL-MCNC: 0.66 MG/DL (ref 0.6–1.3)
EOSINOPHIL # BLD AUTO: 0.29 THOUSAND/ΜL (ref 0–0.61)
EOSINOPHIL NFR BLD AUTO: 4 % (ref 0–6)
ERYTHROCYTE [DISTWIDTH] IN BLOOD BY AUTOMATED COUNT: 12 % (ref 11.6–15.1)
GFR SERPL CREATININE-BSD FRML MDRD: 104 ML/MIN/1.73SQ M
GLUCOSE SERPL-MCNC: 85 MG/DL (ref 65–140)
HCG SERPL QL: NEGATIVE
HCT VFR BLD AUTO: 45.8 % (ref 34.8–46.1)
HGB BLD-MCNC: 14.5 G/DL (ref 11.5–15.4)
IMM GRANULOCYTES # BLD AUTO: 0.01 THOUSAND/UL (ref 0–0.2)
IMM GRANULOCYTES NFR BLD AUTO: 0 % (ref 0–2)
LYMPHOCYTES # BLD AUTO: 2.42 THOUSANDS/ΜL (ref 0.6–4.47)
LYMPHOCYTES NFR BLD AUTO: 34 % (ref 14–44)
MCH RBC QN AUTO: 31.3 PG (ref 26.8–34.3)
MCHC RBC AUTO-ENTMCNC: 31.7 G/DL (ref 31.4–37.4)
MCV RBC AUTO: 99 FL (ref 82–98)
MONOCYTES # BLD AUTO: 0.45 THOUSAND/ΜL (ref 0.17–1.22)
MONOCYTES NFR BLD AUTO: 6 % (ref 4–12)
NEUTROPHILS # BLD AUTO: 3.81 THOUSANDS/ΜL (ref 1.85–7.62)
NEUTS SEG NFR BLD AUTO: 55 % (ref 43–75)
NRBC BLD AUTO-RTO: 0 /100 WBCS
P AXIS: 68 DEGREES
P AXIS: 94 DEGREES
PLATELET # BLD AUTO: 290 THOUSANDS/UL (ref 149–390)
PMV BLD AUTO: 9.7 FL (ref 8.9–12.7)
POTASSIUM SERPL-SCNC: 4.9 MMOL/L (ref 3.5–5.3)
PR INTERVAL: 136 MS
PR INTERVAL: 150 MS
PROT SERPL-MCNC: 7.1 G/DL (ref 6.4–8.2)
QRS AXIS: 71 DEGREES
QRS AXIS: 79 DEGREES
QRSD INTERVAL: 68 MS
QRSD INTERVAL: 70 MS
QT INTERVAL: 400 MS
QT INTERVAL: 406 MS
QTC INTERVAL: 416 MS
QTC INTERVAL: 425 MS
RBC # BLD AUTO: 4.63 MILLION/UL (ref 3.81–5.12)
SODIUM SERPL-SCNC: 139 MMOL/L (ref 136–145)
T WAVE AXIS: 73 DEGREES
T WAVE AXIS: 79 DEGREES
VENTRICULAR RATE: 65 BPM
VENTRICULAR RATE: 66 BPM
WBC # BLD AUTO: 7.04 THOUSAND/UL (ref 4.31–10.16)

## 2019-08-22 PROCEDURE — 85025 COMPLETE CBC W/AUTO DIFF WBC: CPT | Performed by: STUDENT IN AN ORGANIZED HEALTH CARE EDUCATION/TRAINING PROGRAM

## 2019-08-22 PROCEDURE — 87040 BLOOD CULTURE FOR BACTERIA: CPT | Performed by: STUDENT IN AN ORGANIZED HEALTH CARE EDUCATION/TRAINING PROGRAM

## 2019-08-22 PROCEDURE — 73630 X-RAY EXAM OF FOOT: CPT

## 2019-08-22 PROCEDURE — 93010 ELECTROCARDIOGRAM REPORT: CPT | Performed by: INTERNAL MEDICINE

## 2019-08-22 PROCEDURE — 99024 POSTOP FOLLOW-UP VISIT: CPT | Performed by: PODIATRIST

## 2019-08-22 PROCEDURE — 87077 CULTURE AEROBIC IDENTIFY: CPT | Performed by: STUDENT IN AN ORGANIZED HEALTH CARE EDUCATION/TRAINING PROGRAM

## 2019-08-22 PROCEDURE — 84703 CHORIONIC GONADOTROPIN ASSAY: CPT | Performed by: STUDENT IN AN ORGANIZED HEALTH CARE EDUCATION/TRAINING PROGRAM

## 2019-08-22 PROCEDURE — 87070 CULTURE OTHR SPECIMN AEROBIC: CPT | Performed by: STUDENT IN AN ORGANIZED HEALTH CARE EDUCATION/TRAINING PROGRAM

## 2019-08-22 PROCEDURE — 87186 SC STD MICRODIL/AGAR DIL: CPT | Performed by: STUDENT IN AN ORGANIZED HEALTH CARE EDUCATION/TRAINING PROGRAM

## 2019-08-22 PROCEDURE — 93005 ELECTROCARDIOGRAM TRACING: CPT

## 2019-08-22 PROCEDURE — 71046 X-RAY EXAM CHEST 2 VIEWS: CPT

## 2019-08-22 PROCEDURE — 80053 COMPREHEN METABOLIC PANEL: CPT | Performed by: STUDENT IN AN ORGANIZED HEALTH CARE EDUCATION/TRAINING PROGRAM

## 2019-08-22 PROCEDURE — 87205 SMEAR GRAM STAIN: CPT | Performed by: STUDENT IN AN ORGANIZED HEALTH CARE EDUCATION/TRAINING PROGRAM

## 2019-08-22 RX ORDER — PRAMIPEXOLE DIHYDROCHLORIDE 1 MG/1
1 TABLET ORAL
Status: DISCONTINUED | OUTPATIENT
Start: 2019-08-22 | End: 2019-08-25 | Stop reason: HOSPADM

## 2019-08-22 RX ORDER — POLYETHYLENE GLYCOL 3350 17 G/17G
17 POWDER, FOR SOLUTION ORAL DAILY PRN
Status: DISCONTINUED | OUTPATIENT
Start: 2019-08-22 | End: 2019-08-25 | Stop reason: HOSPADM

## 2019-08-22 RX ORDER — DIPHENHYDRAMINE HCL 25 MG
25 TABLET ORAL EVERY 6 HOURS PRN
Status: DISCONTINUED | OUTPATIENT
Start: 2019-08-22 | End: 2019-08-25 | Stop reason: HOSPADM

## 2019-08-22 RX ORDER — GABAPENTIN 300 MG/1
300 CAPSULE ORAL 3 TIMES DAILY
Status: DISCONTINUED | OUTPATIENT
Start: 2019-08-22 | End: 2019-08-25 | Stop reason: HOSPADM

## 2019-08-22 RX ORDER — VARENICLINE TARTRATE 0.5 MG/1
1 TABLET, FILM COATED ORAL 2 TIMES DAILY
Status: DISCONTINUED | OUTPATIENT
Start: 2019-08-22 | End: 2019-08-25 | Stop reason: HOSPADM

## 2019-08-22 RX ORDER — ONDANSETRON 2 MG/ML
4 INJECTION INTRAMUSCULAR; INTRAVENOUS EVERY 6 HOURS PRN
Status: DISCONTINUED | OUTPATIENT
Start: 2019-08-22 | End: 2019-08-25 | Stop reason: HOSPADM

## 2019-08-22 RX ORDER — METRONIDAZOLE 500 MG/1
500 TABLET ORAL EVERY 8 HOURS SCHEDULED
Status: DISCONTINUED | OUTPATIENT
Start: 2019-08-22 | End: 2019-08-24

## 2019-08-22 RX ORDER — HYDROCODONE BITARTRATE AND ACETAMINOPHEN 5; 325 MG/1; MG/1
1 TABLET ORAL EVERY 6 HOURS PRN
Status: DISCONTINUED | OUTPATIENT
Start: 2019-08-22 | End: 2019-08-25 | Stop reason: HOSPADM

## 2019-08-22 RX ORDER — ACETAMINOPHEN 325 MG/1
650 TABLET ORAL EVERY 6 HOURS PRN
Status: DISCONTINUED | OUTPATIENT
Start: 2019-08-22 | End: 2019-08-25 | Stop reason: HOSPADM

## 2019-08-22 RX ORDER — CLINDAMYCIN PHOSPHATE 600 MG/50ML
600 INJECTION INTRAVENOUS EVERY 8 HOURS
Status: DISCONTINUED | OUTPATIENT
Start: 2019-08-22 | End: 2019-08-25

## 2019-08-22 RX ORDER — ALBUTEROL SULFATE 90 UG/1
1 AEROSOL, METERED RESPIRATORY (INHALATION) EVERY 4 HOURS PRN
Status: DISCONTINUED | OUTPATIENT
Start: 2019-08-22 | End: 2019-08-25 | Stop reason: HOSPADM

## 2019-08-22 RX ADMIN — ALBUTEROL SULFATE 1 PUFF: 90 AEROSOL, METERED RESPIRATORY (INHALATION) at 23:36

## 2019-08-22 RX ADMIN — METRONIDAZOLE 500 MG: 500 TABLET, FILM COATED ORAL at 22:00

## 2019-08-22 RX ADMIN — METRONIDAZOLE 500 MG: 500 TABLET, FILM COATED ORAL at 14:12

## 2019-08-22 RX ADMIN — PRAMIPEXOLE DIHYDROCHLORIDE 1 MG: 1 TABLET ORAL at 22:00

## 2019-08-22 RX ADMIN — HYDROCODONE BITARTRATE AND ACETAMINOPHEN 1 TABLET: 5; 325 TABLET ORAL at 20:00

## 2019-08-22 RX ADMIN — ONDANSETRON 4 MG: 2 INJECTION INTRAMUSCULAR; INTRAVENOUS at 23:22

## 2019-08-22 RX ADMIN — CLINDAMYCIN PHOSPHATE 600 MG: 600 INJECTION, SOLUTION INTRAVENOUS at 20:00

## 2019-08-22 RX ADMIN — GABAPENTIN 300 MG: 300 CAPSULE ORAL at 20:00

## 2019-08-22 RX ADMIN — VARENICLINE TARTRATE 1 MG: 0.5 TABLET, FILM COATED ORAL at 14:23

## 2019-08-22 RX ADMIN — GABAPENTIN 300 MG: 300 CAPSULE ORAL at 17:00

## 2019-08-22 RX ADMIN — CLINDAMYCIN PHOSPHATE 600 MG: 600 INJECTION, SOLUTION INTRAVENOUS at 14:12

## 2019-08-22 RX ADMIN — HYDROCODONE BITARTRATE AND ACETAMINOPHEN 1 TABLET: 5; 325 TABLET ORAL at 14:10

## 2019-08-22 RX ADMIN — DIPHENHYDRAMINE HCL 25 MG: 25 TABLET ORAL at 23:36

## 2019-08-22 RX ADMIN — ENOXAPARIN SODIUM 40 MG: 40 INJECTION SUBCUTANEOUS at 14:11

## 2019-08-22 RX ADMIN — VARENICLINE TARTRATE 1 MG: 0.5 TABLET, FILM COATED ORAL at 17:52

## 2019-08-22 NOTE — PROGRESS NOTES
PAtient returns for postop infection  She was taking doxycycline  It is still draining and dehisced  She reports low grade fever over 99  In office I get a temperature of 99 2°  Patient feels feverish and has chills  I am admitting her to the hospital for IV antibiotics, XRay, possible washout

## 2019-08-22 NOTE — H&P
H&P Exam - Ling Sloan 51 y o  female MRN: [id number]    Unit/Bed#: E5 -01 Encounter: [id number]      Assessment/Plan     Assessment:  1  Left foot post-operative infection  - s/p Left foot removal of hardware from phalanx of great toe, 1st mtpj arthrodesis and lateral foot attempted [facility] (DOS 8/1/19)  2  RLS  3  Previous smoker, currently on chantix    Plan:  - Admit for antibiotics (IV clindamycin and po flagyl) due to failure of outpt doxy  Pending improvement, patient may need OR washout, appreciate IM surgical clearance  Patient to require greater than 2 midnight stay  - CBC, CMP, Wcx, Bcx pending  - L foot XR pending  - IM consult for pre-op clearance  - NWB LLE  - Lovenox vte ppx  - c/w home medications    History of Present Illness     HPI:  Ling Sloan is a 51 y o  female w/ PMH significant for RLS on gabapentin, previous smoker on chantix who presents with Left foot psot-operative pain and infection  Patient had left foot hallux MYRA, 1st mtpj arthrodesis and lateral foot attempted [facility] (DOS 8/1/19) performed by Dr Safia Dai  Patient was seen 8/6/19 for first post-op visit with no issues  She presented 8/10/19 to [facility] ER and was sent home with po doxy  Patient was again seen 8/15/19 in office for 2nd post-op visit, no SOI at that time and sutures were removed  Patient was again seen this morning (8/22) in office, with "a drop of purulence" at both incision sites and note eschar/wounds, patient was also febrile at 99 2°  Patient was then admitted to [facility] for IV abx due to failure of outpt po abx  Patient endorses having low grade fever over the past week and feeling "sick " She endorses nausea, upset stomach, fevers, chills  Patient denies vomiting, chest pain, shortness of breath  PCP: Sarina Kohli MD    Review of Systems   Constitutional: Positive for activity change, chills, fatigue and fever  HENT: Negative  Eyes: Negative for photophobia and visual disturbance     Respiratory: Negative for cough, chest tightness and shortness of breath  Cardiovascular: Negative for chest pain and leg swelling  Gastrointestinal: Positive for abdominal pain and nausea  Negative for diarrhea and vomiting  Endocrine: Negative  Genitourinary: Negative  Musculoskeletal: Negative for arthralgias  Left foot pain   Skin: Positive for color change and wound  Allergic/Immunologic: Negative  Neurological: Negative  Psychiatric/Behavioral: Negative  Historical Information   Past Medical History:   Diagnosis Date    Bronchitis     Neuropathy     PONV (postoperative nausea and vomiting)     Restless leg syndrome      Past Surgical History:   Procedure Laterality Date    BACK SURGERY      Lumbar X2    CERVICAL FUSION       SECTION      X1    CHOLECYSTECTOMY      COLONOSCOPY      CYST REMOVAL      Scalp, lorna middle fingers    FOOT SURGERY Left     X2    HYSTERECTOMY      CA FUSION BIG TOE,MT-P JT Left 2019    Procedure: FOOT 1ST MTPJ ARTHRODESIS / FUSION;  Surgeon: Sheree Dumas DPM;  [facility];  Service: Podiatry    CA REMOVAL DEEP IMPLANT Left 2019    Procedure: FOOT GREAT TOE REMOVAL OF HARDWARE;  Surgeon: Sheree Dumas DPM;  [facility];  Service: Podiatry    TONSILLECTOMY      and adeniodectomy     Social History   Social History     Substance and Sexual Activity   Alcohol Use Yes    Frequency: 2-4 times a month    Drinks per session: 1 or 2    Binge frequency: Never    Comment: Socially     Social History     Substance and Sexual Activity   Drug Use Never     Social History     Tobacco Use   Smoking Status Former Smoker    Packs/day: 1 00    Years: 30 00    Pack years: 30 00    Last attempt to quit: 2019    Years since quittin 1   Smokeless Tobacco Never Used   Tobacco Comment    Quitting-taking Chantix     Family History: No family history on file      Meds/Allergies   PTA meds:   Prior to Admission Medications   Prescriptions Last Dose Informant Patient Reported? Taking? CHANTIX STARTING MONTH ABDIRASHID 0 5 MG X 11 & 1 MG X 42 tablet   Yes No   Sig: Take by mouth 2 (two) times a day    albuterol (PROVENTIL HFA,VENTOLIN HFA) 90 mcg/act inhaler   Yes No   Sig: Inhale 1 puff every 4 (four) hours as needed    gabapentin (NEURONTIN) 300 mg capsule   Yes No   Sig: Take 300 mg by mouth 3 (three) times a day    multivitamin (THERAGRAN) TABS   Yes No   Sig: Take 1 tablet by mouth daily   pramipexole (MIRAPEX) 1 mg tablet   Yes No   Sig: Take 1 mg by mouth daily at bedtime       Facility-Administered Medications: None     Allergies   Allergen Reactions    Cephalexin Hives    Codeine Other (See Comments) and Dizziness       restless    Metoclopramide Other (See Comments)     muscle spasms       Objective   Vitals: There were no vitals taken for this visit  No intake or output data in the 24 hours ending 08/22/19 1311    Invasive Devices     None                 Physical Exam   Constitutional: She is oriented to person, place, and time  She appears well-developed and well-nourished  HENT:   Head: Normocephalic and atraumatic  Eyes: Pupils are equal, round, and reactive to light  EOM are normal    Neck: Normal range of motion  Neck supple  Cardiovascular: Normal rate, regular rhythm and intact distal pulses  Palpable 2/4 DP/PT pulses  Legs to toes warm to warm   Pulmonary/Chest: Effort normal  No respiratory distress  She has no wheezes  Abdominal: Soft  There is no tenderness  Musculoskeletal: She exhibits edema and tenderness  Left foot pain on palpation and edema to dorsal-medial and dorsal-lateral foot at and surrounding incisions  Absent ROM to 1st mptj L 2/2 fusion  Pain does not extend proximal to the TMT jts  Neurological: She is alert and oriented to person, place, and time  No sensory deficit  Skin: Skin is warm  Capillary refill takes 2 to 3 seconds     Dorsal-medial left foot wounds/surgical incision superficial dehiscence measuring approximately 4 5x1 0x0 2cm in total, fibrogranular base with intermittent eschar, no deep probe (no bone or tendons exposed), no purulence expressed  Mild periwound erythema noted  Dorsal-lateral left foot wound/surgical incision superficial dehiscence site measuring approximately 1 0x0 5x0 2cm, fibrogranular base, no deep probe (no bone or tendons exposed), no purulence expressed  Mild periwound erythema noted  Area of eschars along incision noted  Psychiatric: She has a normal mood and affect  Her behavior is normal      left dorsal-medial    left dorsal-lateral    left foot          Code Status: Level 1 - Full Code  Advance Directive and Living Will:      Power of :    POLST:      Counseling / Coordination of Care  Total floor / unit time spent today 20 minutes  Greater than 50% of total time was spent with the patient and / or family counseling and / or coordination of care

## 2019-08-22 NOTE — PLAN OF CARE
Problem: Potential for Falls  Goal: Patient will remain free of falls  Description  INTERVENTIONS:  - Assess patient frequently for physical needs  -  Identify cognitive and physical deficits and behaviors that affect risk of falls    -  [place] fall precautions as indicated by assessment   - Educate patient/family on patient safety including physical limitations  - Instruct patient to call for assistance with activity based on assessment  - Modify environment to reduce risk of injury  - Consider OT/PT consult to assist with strengthening/mobility  Outcome: Progressing     Problem: PAIN - ADULT  Goal: Verbalizes/displays adequate comfort level or baseline comfort level  Description  Interventions:  - Encourage patient to monitor pain and request assistance  - Assess pain using appropriate pain scale  - Administer analgesics based on type and severity of pain and evaluate response  - Implement non-pharmacological measures as appropriate and evaluate response  - Consider cultural and social influences on pain and pain management  - Notify physician/advanced practitioner if interventions unsuccessful or patient reports new pain  Outcome: Progressing     Problem: INFECTION - ADULT  Goal: Absence or prevention of progression during hospitalization  Description  INTERVENTIONS:  - Assess and monitor for signs and symptoms of infection  - Monitor lab/diagnostic results  - Monitor all insertion sites, i e  indwelling lines, tubes, and drains  - Monitor endotracheal if appropriate and nasal secretions for changes in amount and color  - [place] appropriate cooling/warming therapies per order  - Administer medications as ordered  - Instruct and encourage patient and family to use good hand hygiene technique  - Identify and instruct in appropriate isolation precautions for identified infection/condition  Outcome: Progressing  Goal: Absence of fever/infection during neutropenic period  Description  INTERVENTIONS:  - Monitor WBC    Outcome: Progressing     Problem: SAFETY ADULT  Goal: Maintain or return to baseline ADL function  Description  INTERVENTIONS:  -  Assess patient's ability to carry out ADLs; assess patient's baseline for ADL function and identify physical deficits which impact ability to perform ADLs (bathing, care of mouth/teeth, toileting, grooming, dressing, etc )  - Assess/evaluate cause of self-care deficits   - Assess range of motion  - Assess patient's mobility; develop plan if impaired  - Assess patient's need for assistive devices and provide as appropriate  - Encourage maximum independence but intervene and supervise when necessary  - Involve family in performance of ADLs  - Assess for home care needs following discharge   - Consider OT consult to assist with ADL evaluation and planning for discharge  - Provide patient education as appropriate  Outcome: Progressing  Goal: Maintain or return mobility status to optimal level  Description  INTERVENTIONS:  - Assess patient's baseline mobility status (ambulation, transfers, stairs, etc )    - Identify cognitive and physical deficits and behaviors that affect mobility  - Identify mobility aids required to assist with transfers and/or ambulation (gait belt, sit-to-stand, lift, walker, cane, etc )  - [place] fall precautions as indicated by assessment  - Record patient progress and toleration of activity level on Mobility SBAR; progress patient to next Phase/Stage  - Instruct patient to call for assistance with activity based on assessment  - Consider rehabilitation consult to assist with strengthening/weightbearing, etc   Outcome: Progressing     Problem: DISCHARGE PLANNING  Goal: Discharge to home or other facility with appropriate resources  Description  INTERVENTIONS:  - Identify barriers to discharge w/patient and caregiver  - Arrange for needed discharge resources and transportation as appropriate  - Identify discharge learning needs (meds, wound care, etc )  - Arrange for interpretive services to assist at discharge as needed  - Refer to Case Management Department for coordinating discharge planning if the patient needs post-hospital services based on physician/advanced practitioner order or complex needs related to functional status, cognitive ability, or social support system  Outcome: Progressing     Problem: Knowledge Deficit  Goal: Patient/family/caregiver demonstrates understanding of disease process, treatment plan, medications, and discharge instructions  Description  Complete learning assessment and assess knowledge base    Interventions:  - Provide teaching at level of understanding  - Provide teaching via preferred learning methods  Outcome: Progressing

## 2019-08-23 PROBLEM — Z87.891 FORMER LIGHT TOBACCO SMOKER: Status: ACTIVE | Noted: 2019-08-23

## 2019-08-23 PROBLEM — G25.81 RESTLESS LEGS SYNDROME (RLS): Status: ACTIVE | Noted: 2019-08-23

## 2019-08-23 LAB
ANION GAP SERPL CALCULATED.3IONS-SCNC: 7 MMOL/L (ref 4–13)
BUN SERPL-MCNC: 14 MG/DL (ref 5–25)
CALCIUM SERPL-MCNC: 9.3 MG/DL (ref 8.3–10.1)
CHLORIDE SERPL-SCNC: 105 MMOL/L (ref 100–108)
CO2 SERPL-SCNC: 29 MMOL/L (ref 21–32)
CREAT SERPL-MCNC: 0.8 MG/DL (ref 0.6–1.3)
ERYTHROCYTE [DISTWIDTH] IN BLOOD BY AUTOMATED COUNT: 12.1 % (ref 11.6–15.1)
GFR SERPL CREATININE-BSD FRML MDRD: 87 ML/MIN/1.73SQ M
GLUCOSE SERPL-MCNC: 94 MG/DL (ref 65–140)
HCT VFR BLD AUTO: 40 % (ref 34.8–46.1)
HGB BLD-MCNC: 13.2 G/DL (ref 11.5–15.4)
MCH RBC QN AUTO: 31.1 PG (ref 26.8–34.3)
MCHC RBC AUTO-ENTMCNC: 33 G/DL (ref 31.4–37.4)
MCV RBC AUTO: 94 FL (ref 82–98)
PLATELET # BLD AUTO: 277 THOUSANDS/UL (ref 149–390)
PMV BLD AUTO: 9.8 FL (ref 8.9–12.7)
POTASSIUM SERPL-SCNC: 4.5 MMOL/L (ref 3.5–5.3)
RBC # BLD AUTO: 4.24 MILLION/UL (ref 3.81–5.12)
SODIUM SERPL-SCNC: 141 MMOL/L (ref 136–145)
WBC # BLD AUTO: 6.18 THOUSAND/UL (ref 4.31–10.16)

## 2019-08-23 PROCEDURE — 80048 BASIC METABOLIC PNL TOTAL CA: CPT | Performed by: STUDENT IN AN ORGANIZED HEALTH CARE EDUCATION/TRAINING PROGRAM

## 2019-08-23 PROCEDURE — 99253 IP/OBS CNSLTJ NEW/EST LOW 45: CPT | Performed by: PHYSICIAN ASSISTANT

## 2019-08-23 PROCEDURE — 99024 POSTOP FOLLOW-UP VISIT: CPT | Performed by: PODIATRIST

## 2019-08-23 PROCEDURE — 85027 COMPLETE CBC AUTOMATED: CPT | Performed by: STUDENT IN AN ORGANIZED HEALTH CARE EDUCATION/TRAINING PROGRAM

## 2019-08-23 RX ADMIN — PRAMIPEXOLE DIHYDROCHLORIDE 1 MG: 1 TABLET ORAL at 22:18

## 2019-08-23 RX ADMIN — GABAPENTIN 300 MG: 300 CAPSULE ORAL at 22:17

## 2019-08-23 RX ADMIN — METRONIDAZOLE 500 MG: 500 TABLET, FILM COATED ORAL at 14:12

## 2019-08-23 RX ADMIN — CLINDAMYCIN PHOSPHATE 600 MG: 600 INJECTION, SOLUTION INTRAVENOUS at 21:40

## 2019-08-23 RX ADMIN — METRONIDAZOLE 500 MG: 500 TABLET, FILM COATED ORAL at 05:33

## 2019-08-23 RX ADMIN — GABAPENTIN 300 MG: 300 CAPSULE ORAL at 16:28

## 2019-08-23 RX ADMIN — HYDROCODONE BITARTRATE AND ACETAMINOPHEN 1 TABLET: 5; 325 TABLET ORAL at 06:56

## 2019-08-23 RX ADMIN — VARENICLINE TARTRATE 1 MG: 0.5 TABLET, FILM COATED ORAL at 08:58

## 2019-08-23 RX ADMIN — METRONIDAZOLE 500 MG: 500 TABLET, FILM COATED ORAL at 22:24

## 2019-08-23 RX ADMIN — GABAPENTIN 300 MG: 300 CAPSULE ORAL at 08:58

## 2019-08-23 RX ADMIN — CLINDAMYCIN PHOSPHATE 600 MG: 600 INJECTION, SOLUTION INTRAVENOUS at 13:00

## 2019-08-23 RX ADMIN — ONDANSETRON 4 MG: 2 INJECTION INTRAMUSCULAR; INTRAVENOUS at 16:28

## 2019-08-23 RX ADMIN — CLINDAMYCIN PHOSPHATE 600 MG: 600 INJECTION, SOLUTION INTRAVENOUS at 05:33

## 2019-08-23 RX ADMIN — VARENICLINE TARTRATE 1 MG: 0.5 TABLET, FILM COATED ORAL at 17:59

## 2019-08-23 RX ADMIN — ENOXAPARIN SODIUM 40 MG: 40 INJECTION SUBCUTANEOUS at 08:58

## 2019-08-23 RX ADMIN — ACETAMINOPHEN 650 MG: 325 TABLET ORAL at 14:14

## 2019-08-23 RX ADMIN — ONDANSETRON 4 MG: 2 INJECTION INTRAMUSCULAR; INTRAVENOUS at 22:18

## 2019-08-23 NOTE — CONSULTS
[address] Internal Medicine  Consult- Zi Valdez 1970, 51 y o  female MRN: [id number]  Unit/Bed#: E5 -01 Encounter: [id number]  Primary Care Provider: Anshul Rock MD   Date and time admitted to hospital: 8/22/2019 12:28 PM    Inpatient consult to Internal Medicine  Consult performed by: Chandrika Ardon PA-C  Consult ordered by: Zi Lowe DPM        * Infection of superficial incisional surgical site after procedure  Assessment & Plan  · Management per Podiatry  · X-ray revealed the changes after bunionectomy  · Continue IV antibiotics per primary team  · Low suspicion for osteomyelitis at this time, patient cannot have MRI given hardware  · May need OR washout  · CXR negative for any acute cardiopulmonary disease, no cardiac history  · EKG within normal limits on admission  Patient is low risk for surgical procedure    Former light tobacco smoker  Assessment & Plan  · Continue Chantix    Restless legs syndrome (RLS)  Assessment & Plan  · Continue gabapentin        VTE Prophylaxis: Enoxaparin (Lovenox)  / reason for no mechanical VTE prophylaxis Low risk     Recommendations for Discharge:  · Per primary team once patient can safely be transitioned to p o  Antibiotics and wound is stable and healing    Counseling / Coordination of Care Time: 20 minutes  Greater than 50% of total time spent on patient counseling and coordination of care  Collaboration of Care: Were Recommendations Directly Discussed with Primary Treatment Team? - No     History of Present Illness:    Zi Valdez is a 51 y o  female was a previous smoker with RLS status post bunionectomy who is originally admitted to the Podiatry service due to concern for postoperative infection  We are consulted for medical clearance for surgery  Patient denies any other medical history  Daily medication includes gabapentin, Mirapex and Chantix  Patient has no cardiac history    Reports significant improvement in foot pain since admission and starting IV antibiotics  She is hopeful to not have to return to the operating room  However, she did return to the operating room she is a low risk surgical candidate and is medically cleared  Review of Systems:    Review of Systems   Musculoskeletal: Positive for joint swelling (foot pain s/p bunionectomy)  All other systems reviewed and are negative  Past Medical and Surgical History:     Past Medical History:   Diagnosis Date    Bronchitis     Neuropathy     PONV (postoperative nausea and vomiting)     Restless leg syndrome        Past Surgical History:   Procedure Laterality Date    BACK SURGERY      Lumbar X2    CERVICAL FUSION       SECTION      X1    CHOLECYSTECTOMY      COLONOSCOPY      CYST REMOVAL      Scalp, lorna middle fingers    FOOT SURGERY Left     X2    HYSTERECTOMY      AZ FUSION BIG TOE,MT-P JT Left 2019    Procedure: FOOT 1ST MTPJ ARTHRODESIS / FUSION;  Surgeon: Sharon Elder DPM;  Location: [facility];  Service: Podiatry    AZ REMOVAL DEEP IMPLANT Left 2019    Procedure: FOOT GREAT TOE REMOVAL OF HARDWARE;  Surgeon: Sharon Elder DPM;  Location: [facility];  Service: Podiatry    TONSILLECTOMY      and adeniodectomy       Meds/Allergies:    PTA meds:   Prior to Admission Medications   Prescriptions Last Dose Informant Patient Reported? Taking? CHANTIX STARTING MONTH ABDIRASHID 0 5 MG X 11 & 1 MG X 42 tablet   Yes No   Sig: Take by mouth 2 (two) times a day    albuterol (PROVENTIL HFA,VENTOLIN HFA) 90 mcg/act inhaler   Yes No   Sig: Inhale 1 puff every 4 (four) hours as needed    gabapentin (NEURONTIN) 300 mg capsule   Yes No   Sig: Take 300 mg by mouth 3 (three) times a day    multivitamin (THERAGRAN) TABS   Yes No   Sig: Take 1 tablet by mouth daily   pramipexole (MIRAPEX) 1 mg tablet   Yes No   Sig: Take 1 mg by mouth daily at bedtime       Facility-Administered Medications: None       Allergies:    Allergies   Allergen Reactions    Cephalexin Hives    Codeine Other (See Comments) and Dizziness       restless    Metoclopramide Other (See Comments)     muscle spasms       Social History:     Marital Status: /Civil Union    Substance Use History:   Social History     Substance and Sexual Activity   Alcohol Use Yes    Frequency: 2-4 times a month    Drinks per session: 1 or 2    Binge frequency: Never    Comment: Socially     Social History     Tobacco Use   Smoking Status Former Smoker    Packs/day: 1 00    Years: 30 00    Pack years: 30 00    Last attempt to quit: 2019    Years since quittin 1   Smokeless Tobacco Never Used   Tobacco Comment    Quitting-taking Chantix     Social History     Substance and Sexual Activity   Drug Use Never       Family History:    No family history on file  Physical Exam:     Vitals:   Blood Pressure: 101/57 (19)  Pulse: 74 (19)  Temperature: (!) 96 9 °F (36 1 °C) (19)  Temp Source: Tympanic (19)  Respirations: 18 (19)  SpO2: 96 % (19)    Physical Exam   Constitutional: She appears well-developed and well-nourished  No distress  HENT:   Head: Normocephalic and atraumatic  Eyes: Conjunctivae are normal  No scleral icterus  Cardiovascular: Normal rate and regular rhythm  No murmur heard  Pulmonary/Chest: Effort normal and breath sounds normal  No respiratory distress  She has no wheezes  She has no rales  Abdominal: Soft  She exhibits no distension  There is no tenderness  Musculoskeletal: She exhibits no edema  Right foot: There is deformity (s/p bunionectomy foot is dressed, clean, dry, intact)  Neurological: She is alert  Skin: Skin is warm and dry  No erythema  Psychiatric: She has a normal mood and affect  Nursing note and vitals reviewed  Additional Data:     Lab Results: I have personally reviewed pertinent reports        Results from last 7 days   Lab Units 19  7226 08/22/19  1322   WBC Thousand/uL 6 18 7 04   HEMOGLOBIN g/dL 13 2 14 5   HEMATOCRIT % 40 0 45 8   PLATELETS Thousands/uL 277 290   NEUTROS PCT %  --  55   LYMPHS PCT %  --  34   MONOS PCT %  --  6   EOS PCT %  --  4     Results from last 7 days   Lab Units 08/23/19  0513 08/22/19  1335   SODIUM mmol/L 141 139   POTASSIUM mmol/L 4 5 4 9   CHLORIDE mmol/L 105 106   CO2 mmol/L 29 25   BUN mg/dL 14 14   CREATININE mg/dL 0 80 0 66   ANION GAP mmol/L 7 8   CALCIUM mg/dL 9 3 9 2   ALBUMIN g/dL  --  3 7   TOTAL BILIRUBIN mg/dL  --  0 32   ALK PHOS U/L  --  71   ALT U/L  --  24   AST U/L  --  30   GLUCOSE RANDOM mg/dL 94 85             No results found for: HGBA1C            Imaging: I have personally reviewed pertinent reports  XR chest pa & lateral   Final Result by Osbaldo Ziegler DO (08/22 1636)      No acute cardiopulmonary disease  Workstation performed: [id number]         XR foot 3+ vw left   Final Result by Osbaldo Ziegler DO (08/22 1657)      Typical postoperative appearance  Workstation performed: [id number]             EKG, Pathology, and Other Studies Reviewed on Admission:   · EKG: NSR, 65bpm,     ** Please Note: This note has been constructed using a voice recognition system   **

## 2019-08-23 NOTE — UTILIZATION REVIEW
Initial Clinical Review    Admission: Date/Time/Statement: Inpatient Admission Orders (From admission, onward)     Ordered        08/22/19 1236  Inpatient Admission  Once                   Orders Placed This Encounter   Procedures    Inpatient Admission     Standing Status:   Standing     Number of Occurrences:   1     Order Specific Question:   Admitting Physician     Answer:   Maria R Moya     Order Specific Question:   Level of Care     Answer:   Med Surg [16]     Order Specific Question:   Estimated length of stay     Answer:   More than 2 Midnights     Order Specific Question:   Certification     Answer:   I certify that inpatient services are medically necessary for this patient for a duration of greater than two midnights  See H&P and MD Progress Notes for additional information about the patient's course of treatment  No chief complaint on file  Assessment/Plan:   51  Y O female  Directly  Admitted from podiatry office  PMH  Is   left foot hallux MYRA, 1st mtpj arthrodesis and lateral foot attempted [facility]   On  8/1/2019  First  Post op visit  Was  8/6  With no  Issues identified  Pt  Seen  8/10  In ED  And  D/c  Home on po antibiotics  2nd  Post op visit  Was  8/15 in podiatry office, sutures  Removed  Next  Office  Visit  Was  8/22, day of  Admission when   Purulence  Noted at  Both incision  Sites as well as  Eschar  At wounds and  Low  Grade  Temp  Sent  For direct  Admission  With L foot  Post op infection after failing  Op treatment  And plan is  JIGNA, poss   Additional surgical intervention, apin control and NWB  LLE  PROGRESS  NOTE   8/23  Cont  JIGNA  Area  Appears  Improved  Still for  Poss  Additional surgery                    ED Triage Vitals   Temperature Pulse Respirations Blood Pressure SpO2   08/22/19 1410 08/22/19 1410 08/22/19 1410 08/22/19 1410 08/22/19 1520   (!) 97 4 °F (36 3 °C) 76 17 112/67 98 %      Temp Source Heart Rate Source Patient Position - Orthostatic VS BP Location FiO2 (%)   08/22/19 1410 08/22/19 1410 08/22/19 1410 08/22/19 1410 --   Tympanic Monitor Sitting Right arm       Pain Score       08/22/19 1231       2        Wt Readings from Last 1 Encounters:   08/22/19 47 2 kg (104 lb)     Additional Vital Signs:   /23/19 0700  96 9 °F (36 1 °C)Abnormal   74  18  101/57  96 %  None (Room air)  Lying   08/22/19 2256  96 4 °F (35 8 °C)Abnormal   71  18  118/76  97 %  None (Room air)  Lying   08/22/19 1520  99 5 °F (37 5 °C)  78  18  112/58  98 %  None (Room air)  Lying   08/22/19 1410  97 4 °F (36 3 °C)Abnormal   76  17  112/67      Sitting         Pertinent Labs/Diagnostic Test Results:   Results from last 7 days   Lab Units 08/23/19  0513 08/22/19  1322   WBC Thousand/uL 6 18 7 04   HEMOGLOBIN g/dL 13 2 14 5   HEMATOCRIT % 40 0 45 8   PLATELETS Thousands/uL 277 290   NEUTROS ABS Thousands/µL  --  3 81         Results from last 7 days   Lab Units 08/23/19  0513 08/22/19  1335   SODIUM mmol/L 141 139   POTASSIUM mmol/L 4 5 4 9   CHLORIDE mmol/L 105 106   CO2 mmol/L 29 25   ANION GAP mmol/L 7 8   BUN mg/dL 14 14   CREATININE mg/dL 0 80 0 66   EGFR ml/min/1 73sq m 87 104   CALCIUM mg/dL 9 3 9 2     Results from last 7 days   Lab Units 08/22/19  1335   AST U/L 30   ALT U/L 24   ALK PHOS U/L 71   TOTAL PROTEIN g/dL 7 1   ALBUMIN g/dL 3 7   TOTAL BILIRUBIN mg/dL 0 32         Results from last 7 days   Lab Units 08/23/19  0513 08/22/19  1335   GLUCOSE RANDOM mg/dL 94 85           Results from last 7 days   Lab Units 08/22/19  1335   GRAM STAIN RESULT  No polys seen  No organisms seen   WOUND CULTURE  Culture too young- will reincubate   CXR  ( 8/22)    NAD  X ray  L foot  ( 8/22)        Postoperative changes of bunionectomy   Typical postoperative appearance noted   No soft tissue swelling   Cerclage wire placement   Metatarsals are noted      Impression:       Typical postoperative appearance             Present on Admission:   Infection of superficial incisional surgical site after procedure      Admitting Diagnosis: Cellulitis and abscess of foot [L03 119, L02 619]  Age/Sex: 51 y o  female  Admission Orders:    Current Facility-Administered Medications:  acetaminophen 650 mg Oral Q6H PRN   albuterol 1 puff Inhalation Q4H PRN   clindamycin 600 mg Intravenous Q8H   diphenhydrAMINE 25 mg Oral Q6H PRN   enoxaparin 40 mg Subcutaneous Daily   gabapentin 300 mg Oral TID   HYDROcodone-acetaminophen 1 tablet Oral Q6H PRN   metroNIDAZOLE 500 mg Oral Q8H EULOGIO   ondansetron 4 mg Intravenous Q6H PRN        X1  8/22   polyethylene glycol 17 g Oral Daily PRN   pramipexole 1 mg Oral HS   varenicline 1 mg Oral BID       IP CONSULT TO INTERNAL MEDICINE   NWZHANNA ORTIZ    Network Utilization Review Department  Phone: [phone]; Fax [phone]  [email or web address]  ATTENTION: Please call with any questions or concerns to [phone]  and carefully listen to the prompts so that you are directed to the right person  Send all requests for admission clinical reviews, approved or denied determinations and any other requests to fax [phone]   All voicemails are confidential

## 2019-08-23 NOTE — PROGRESS NOTES
Progress Note - Podiatry  Ashley Aquino 51 y o  female MRN: [id number]  Unit/Bed#: E5 -01 Encounter: [id number]    Assessment/Plan:  1  L foot post-op infection two incsisions, s/p removal of hardware hallux, 1st MPJ arthrodesis and lateral foot attempted [facility] on 8/1/19  2  Prior smoker-c/w Chantix  3  Restless leg syndrome  4  RSD  5  Fibromyalgia    -continue IV clinda, po flagyl due to failure outpatient po doxycycline  -cellulitis much improving on IV clindamycin p o  Flagyl, will continue to monitor on IV antibiotics; possible OR washout however not if continues to improve  -currently afebrile, no leukocytosis  -BCx pending  -L foot x-ray- post-op changes, hardware intact, no evidence of OM  -WCx no growth at this time  -SLIM consult for pre-op clearance if needed  -NWB LLE  -c/w home medications    Subjective/Objective   Chief Complaint: No chief complaint on file  Subjective: 51 y o  y/o female was seen and evaluated at bedside  Blood pressure 101/57, pulse 74, temperature (!) 96 9 °F (36 1 °C), temperature source Tympanic, resp  rate 18, SpO2 96 %  ,There is no height or weight on file to calculate BMI  Invasive Devices     Peripheral Intravenous Line            Peripheral IV 08/22/19 Right Forearm less than 1 day                Physical Exam:   General: Alert, cooperative and no distress  Lungs: Non labored breathing  Heart: Positive S1, S2  Abdomen: Soft, non-tender  Extremity:     Neurovascular status gross motor function at baseline    Cellulitis to both incision is much improved, no damaris pus, no active drainage                Lab, Imaging and other studies:   CBC:   Lab Results   Component Value Date    WBC 6 18 08/23/2019    HGB 13 2 08/23/2019    HCT 40 0 08/23/2019    MCV 94 08/23/2019     08/23/2019    MCH 31 1 08/23/2019    MCHC 33 0 08/23/2019    RDW 12 1 08/23/2019    MPV 9 8 08/23/2019    NRBC 0 08/22/2019       Imaging: I have personally reviewed pertinent films in PACS  EKG, Pathology, and Other Studies: I have personally reviewed pertinent reports    VTE Pharmacologic Prophylaxis: Enoxaparin (Lovenox)

## 2019-08-23 NOTE — ASSESSMENT & PLAN NOTE
· Management per Podiatry  · X-ray revealed the changes after bunionectomy  · Continue IV antibiotics per primary team  · Low suspicion for osteomyelitis at this time, patient cannot have MRI given hardware  · May need OR washout  · CXR negative for any acute cardiopulmonary disease, no cardiac history  · EKG within normal limits on admission    Patient is low risk for surgical procedure

## 2019-08-24 VITALS
HEART RATE: 70 BPM | RESPIRATION RATE: 18 BRPM | DIASTOLIC BLOOD PRESSURE: 59 MMHG | OXYGEN SATURATION: 96 % | SYSTOLIC BLOOD PRESSURE: 100 MMHG | TEMPERATURE: 97.9 F

## 2019-08-24 LAB
ANION GAP SERPL CALCULATED.3IONS-SCNC: 6 MMOL/L (ref 4–13)
BUN SERPL-MCNC: 13 MG/DL (ref 5–25)
CALCIUM SERPL-MCNC: 9.1 MG/DL (ref 8.3–10.1)
CHLORIDE SERPL-SCNC: 104 MMOL/L (ref 100–108)
CO2 SERPL-SCNC: 29 MMOL/L (ref 21–32)
CREAT SERPL-MCNC: 0.86 MG/DL (ref 0.6–1.3)
ERYTHROCYTE [DISTWIDTH] IN BLOOD BY AUTOMATED COUNT: 12 % (ref 11.6–15.1)
GFR SERPL CREATININE-BSD FRML MDRD: 80 ML/MIN/1.73SQ M
GLUCOSE SERPL-MCNC: 100 MG/DL (ref 65–140)
HCT VFR BLD AUTO: 41.6 % (ref 34.8–46.1)
HGB BLD-MCNC: 13.6 G/DL (ref 11.5–15.4)
MCH RBC QN AUTO: 31.1 PG (ref 26.8–34.3)
MCHC RBC AUTO-ENTMCNC: 32.7 G/DL (ref 31.4–37.4)
MCV RBC AUTO: 95 FL (ref 82–98)
PLATELET # BLD AUTO: 260 THOUSANDS/UL (ref 149–390)
PMV BLD AUTO: 9.4 FL (ref 8.9–12.7)
POTASSIUM SERPL-SCNC: 4 MMOL/L (ref 3.5–5.3)
RBC # BLD AUTO: 4.37 MILLION/UL (ref 3.81–5.12)
SODIUM SERPL-SCNC: 139 MMOL/L (ref 136–145)
WBC # BLD AUTO: 6 THOUSAND/UL (ref 4.31–10.16)

## 2019-08-24 PROCEDURE — 99024 POSTOP FOLLOW-UP VISIT: CPT | Performed by: PODIATRIST

## 2019-08-24 PROCEDURE — 97163 PT EVAL HIGH COMPLEX 45 MIN: CPT

## 2019-08-24 PROCEDURE — G8979 MOBILITY GOAL STATUS: HCPCS

## 2019-08-24 PROCEDURE — G8980 MOBILITY D/C STATUS: HCPCS

## 2019-08-24 PROCEDURE — G8978 MOBILITY CURRENT STATUS: HCPCS

## 2019-08-24 PROCEDURE — 85027 COMPLETE CBC AUTOMATED: CPT | Performed by: STUDENT IN AN ORGANIZED HEALTH CARE EDUCATION/TRAINING PROGRAM

## 2019-08-24 PROCEDURE — 80048 BASIC METABOLIC PNL TOTAL CA: CPT | Performed by: STUDENT IN AN ORGANIZED HEALTH CARE EDUCATION/TRAINING PROGRAM

## 2019-08-24 PROCEDURE — 99232 SBSQ HOSP IP/OBS MODERATE 35: CPT | Performed by: INTERNAL MEDICINE

## 2019-08-24 RX ADMIN — DIPHENHYDRAMINE HCL 25 MG: 25 TABLET ORAL at 21:36

## 2019-08-24 RX ADMIN — METRONIDAZOLE 500 MG: 500 TABLET, FILM COATED ORAL at 05:29

## 2019-08-24 RX ADMIN — VARENICLINE TARTRATE 1 MG: 0.5 TABLET, FILM COATED ORAL at 17:10

## 2019-08-24 RX ADMIN — GABAPENTIN 300 MG: 300 CAPSULE ORAL at 09:35

## 2019-08-24 RX ADMIN — GABAPENTIN 300 MG: 300 CAPSULE ORAL at 21:25

## 2019-08-24 RX ADMIN — ONDANSETRON 4 MG: 2 INJECTION INTRAMUSCULAR; INTRAVENOUS at 20:26

## 2019-08-24 RX ADMIN — ONDANSETRON 4 MG: 2 INJECTION INTRAMUSCULAR; INTRAVENOUS at 12:04

## 2019-08-24 RX ADMIN — GABAPENTIN 300 MG: 300 CAPSULE ORAL at 16:06

## 2019-08-24 RX ADMIN — CLINDAMYCIN PHOSPHATE 600 MG: 600 INJECTION, SOLUTION INTRAVENOUS at 05:30

## 2019-08-24 RX ADMIN — HYDROCODONE BITARTRATE AND ACETAMINOPHEN 1 TABLET: 5; 325 TABLET ORAL at 06:04

## 2019-08-24 RX ADMIN — VARENICLINE TARTRATE 1 MG: 0.5 TABLET, FILM COATED ORAL at 09:35

## 2019-08-24 RX ADMIN — ONDANSETRON 4 MG: 2 INJECTION INTRAMUSCULAR; INTRAVENOUS at 05:29

## 2019-08-24 RX ADMIN — ENOXAPARIN SODIUM 40 MG: 40 INJECTION SUBCUTANEOUS at 09:35

## 2019-08-24 RX ADMIN — CLINDAMYCIN PHOSPHATE 600 MG: 600 INJECTION, SOLUTION INTRAVENOUS at 20:27

## 2019-08-24 RX ADMIN — PRAMIPEXOLE DIHYDROCHLORIDE 1 MG: 1 TABLET ORAL at 21:25

## 2019-08-24 RX ADMIN — CLINDAMYCIN PHOSPHATE 600 MG: 600 INJECTION, SOLUTION INTRAVENOUS at 12:06

## 2019-08-24 NOTE — PROGRESS NOTES
Progress Note - Rick Adjutant 51 y o  female MRN: [id number]    Unit/Bed#: E5 -01 Encounter: [id number]      Subjective: The patient is having nausea today  She has not vomited  She has no chest pain or shortness of breath  She denies diarrhea  Her foot continues to improve  Physical Exam:   Temp:  [97 °F (36 1 °C)-98 °F (36 7 °C)] 98 °F (36 7 °C)  HR:  [58-81] 81  Resp:  [18] 18  BP: (100-121)/(58-81) 100/58    Gen:  Well-developed, well-nourished, in no distress  Neck:  Supple  No lymphadenopathy, goiter, or bruit  Heart:  Regular rhythm  No murmur, gallop, or rub  Lungs:  Clear to auscultation and percussion  No wheezing, rales, or rhonchi    Abd:  Soft with active bowel sounds  No mass, tenderness, or organomegaly  Extremities:  No clubbing, cyanosis, or edema  No calf tenderness  Left foot is wrapped  Neuro:  Alert and oriented  No focal sign  Skin:  Warm and dry      LABS:   CBC:   Lab Results   Component Value Date    WBC 6 00 08/24/2019    HGB 13 6 08/24/2019    HCT 41 6 08/24/2019    MCV 95 08/24/2019     08/24/2019    MCH 31 1 08/24/2019    MCHC 32 7 08/24/2019    RDW 12 0 08/24/2019    MPV 9 4 08/24/2019   , CMP:   Lab Results   Component Value Date    SODIUM 139 08/24/2019    K 4 0 08/24/2019     08/24/2019    CO2 29 08/24/2019    BUN 13 08/24/2019    CREATININE 0 86 08/24/2019    CALCIUM 9 1 08/24/2019    EGFR 80 08/24/2019           Patient Active Problem List   Diagnosis    Hallux valgus (acquired), left foot    Painful orthopaedic hardware ([address] )    Hallux rigidus of left foot    Infection of superficial incisional surgical site after procedure    Restless legs syndrome (RLS)    Former light tobacco smoker       Assessment/Plan:  1  Left foot surgical wound infection, improving  2  Reflex sympathetic dystrophy  3  Nausea most likely related to metronidazole therapy    Metronidazole has been stopped  The patient is on appropriate antiemetics    Clindamycin will be continued  The patient is hopeful that discharge will be possible tomorrow        VTE Pharmacologic Prophylaxis: Enoxaparin (Lovenox)  VTE Mechanical Prophylaxis: sequential compression device

## 2019-08-24 NOTE — PROGRESS NOTES
Progress Note - Podiatry  Yvette Blackwell 51 y o  female MRN: [id number]  Unit/Bed#: E5 -01 Encounter: [id number]    Assessment:  1  Use left foot postoperative infection, status post removal of hardware of the hallux nails, 1st MPJ arthrodesis and lateral foot attempted removal of hardware on 08/01/2019  2  Use history of smoking  3  RestlessLeg syndrome  4  CRPS  5   fibromyalgia    Plan:  · Continue IV clindamycin, discontinue p o  Flagyl due to nausea  · Cellulitis continues to improve with IV clindamycin, currently afebrile and no leukocytosis  · Blood cultures pending, discharge pending blood culture results  · wound culture growing gram-negative rods  · Continue nonweightbearing to the left lower extremity  · Continue home medication regimen  · Plan to be discharged 08/25/2019 pending blood culture results, with p o  Clindamycin     Subjective/Objective   Chief Complaint: No chief complaint on file  Subjective: 51 y o  y/o female was seen and evaluated at bedside  She endorses nausea occurring overnight 8/23  No acute distress at this time  She states she has had difficulty eating while hospitalized secondary to nausea  Blood pressure 100/58, pulse 81, temperature 98 °F (36 7 °C), temperature source Temporal, resp  rate 18, SpO2 97 %  ,There is no height or weight on file to calculate BMI  Invasive Devices     Peripheral Intravenous Line            Peripheral IV 08/22/19 Right Forearm 1 day                Physical Exam:   General: Alert, cooperative and no distress  Lungs: Non labored breathing  Abdomen: Soft, non-tender  Extremity:     NVS at baseline B/l  MSK function at baseline B/l  No calf tenderness noted B/l  No periwound erythema is noted this time  No serosanguineous fluid is noted at this time  Impressions no fluctuance or crepitus  Incisions appeared coapted          Left foot    Left foot      Lab, Imaging and other studies:   I have personally reviewed pertinent lab results  , CBC:   Lab Results   Component Value Date    WBC 6 00 08/24/2019    HGB 13 6 08/24/2019    HCT 41 6 08/24/2019    MCV 95 08/24/2019     08/24/2019    MCH 31 1 08/24/2019    MCHC 32 7 08/24/2019    RDW 12 0 08/24/2019    MPV 9 4 08/24/2019   , CMP:   Lab Results   Component Value Date    SODIUM 139 08/24/2019    K 4 0 08/24/2019     08/24/2019    CO2 29 08/24/2019    BUN 13 08/24/2019    CREATININE 0 86 08/24/2019    CALCIUM 9 1 08/24/2019    EGFR 80 08/24/2019   , Coagulation: No results found for: PT, INR, APTT    Imaging: I have personally reviewed pertinent films in PACS  EKG, Pathology, and Other Studies: I have personally reviewed pertinent reports  Portions of the record may have been created with voice recognition software  Occasional wrong word or "sound a like" substitutions may have occurred due to the inherent limitations of voice recognition software  Read the chart carefully and recognize, using context, where substitutions have occurred

## 2019-08-24 NOTE — PLAN OF CARE
Problem: PHYSICAL THERAPY ADULT  Goal: Performs mobility at highest level of function for planned discharge setting  See evaluation for individualized goals  Description  Treatment/Interventions: Compensatory technique education, Patient/family training  Equipment Recommended: Crutches(owns)       See flowsheet documentation for full assessment, interventions and recommendations  Outcome: Adequate for Discharge  Note:   Prognosis: Excellent  Problem List: Orthopedic restrictions  Assessment: Pt is a 51 y o  female seen for PT evaluation s/p admit to [address]  on 8/22/2019 w/ Infection of superficial incisional surgical site after procedure  She was sent to ER with non improving postoperative infection of both incisions on her left footFollowing fusion of the 1st metatarsophalangeal joint right and removal of hardware 9 days prior  She failed oral doxycycline  IV antibiotics were started with  a possibility she will need an incision and drainage depending on clinical course  Since start of IV antibiotics cellulitis much improving on IV clindamycin p o  Flagyl  Imaging negative of OM  Order placed for PT  Prior to admission, pt was independent w/ all functional mobility w/ crutches, ambulated community distances and elevations, lived in multi-level home, had several PRINCE no railing and lived with spouse and children  Upon evaluation: Pt requires no assistance for bed mobility, transfers, and ambulation with crutches  Pt's clinical presentation is currently unstable/unpredictable given the  gait deviations and orthopedic restrictions combined with medical complications of multiple readmissions and sypmtoms of wound infection  From PT/mobility standpoint, recommendation at time of d/c would be anticipate no needs   Patient is cleared by PT functionally for d/c to home with family support    Barriers to Discharge: None     Recommendation: Home with family support, Home independently     PT - OK to Discharge: Yes    See flowsheet documentation for full assessment

## 2019-08-24 NOTE — PHYSICAL THERAPY NOTE
PHYSICAL THERAPY EVALUATION  NAME:  Ronnie Crawford  DATE: 08/24/19    AGE:   51 y o   Mrn:   [id number]  ADMIT DX:  Cellulitis and abscess of foot [L03 119, L02 619]    Past Medical History:   Diagnosis Date    Bronchitis     Neuropathy     PONV (postoperative nausea and vomiting)     Restless leg syndrome      Length Of Stay: 2  Performed at least 2 patient identifiers during session: Name and Birthday    PHYSICAL THERAPY EVALUATION :    08/24/19 1200   Note Type   Note type Eval only   Pain Assessment   Pain Assessment No/denies pain   Pain Score No Pain   Home Living   Type of [address] Multi-level;Stairs to enter without rails;Bed/bath upstairs;1/2 bath on main level  (Splint level; 8 PRINCE )   Bathroom Accessibility Accessible   Home Equipment Crutches   Prior Function   Level of [place] Independent with ADLs and functional mobility   Lives With Spouse; Family   Receives Help From Family   ADL Assistance Independent   IADLs Independent   Vocational Full time employment   Restrictions/Precautions   Weight Bearing Precautions Per Order Yes   LLE Weight Bearing Per Order NWB   General   Family/Caregiver Present No   Cognition   Overall Cognitive Status WFL   RUE Assessment   RUE Assessment WFL   LUE Assessment   LUE Assessment WFL   RLE Assessment   RLE Assessment WFL   LLE Assessment   LLE Assessment WFL   Coordination   Movements are Fluid and Coordinated 1   Sensation WFL   Bed Mobility   Rolling R 7  Independent   Rolling L 7  Independent   Supine to Sit 7  Independent   Sit to Supine 7  Independent   Transfers   Sit to Stand 7  Independent   Stand to Sit 7  Independent   Ambulation/Elevation   Gait pattern   (NWB LLE)   Gait Assistance 7  Independent   Assistive Device Crutches   Distance 200'   Stair Management Assistance 7  Independent   Balance   Static Sitting Normal   Dynamic Sitting Normal   Static Standing Normal   Dynamic Standing Good   Ambulatory Good   Endurance Deficit Endurance Deficit No   Activity Tolerance   Activity Tolerance Patient tolerated treatment well   Nurse Made Aware Spoke with Luis Woodard RN   Assessment   Prognosis Excellent   Problem List Orthopedic restrictions   Barriers to Discharge None   Goals   Patient Goals To go home   Treatment Day 0   Plan   Treatment/Interventions Compensatory technique education;Patient/family training   Recommendation   Recommendation Home with family support;Home independently   Equipment Recommended Crutches  (owns)   PT - OK to Discharge Yes   Barthel Index   Feeding 10   Bathing 5   Grooming Score 5   Dressing Score 10   Bladder Score 10   Bowels Score 10   Toilet Use Score 10   Transfers (Bed/Chair) Score 15   Mobility (Level Surface) Score 15   Stairs Score 10   Barthel Index Score 100       (Please find full objective findings from PT assessment regarding body systems outlined above)  Assessment: Pt is a 51 y o  female seen for PT evaluation s/p admit to [facility] on 8/22/2019 w/ Infection of superficial incisional surgical site after procedure  She was sent to ER with non improving postoperative infection of both incisions on her left footFollowing fusion of the 1st metatarsophalangeal joint right and removal of hardware 9 days prior  She failed oral doxycycline  IV antibiotics were started with  a possibility she will need an incision and drainage depending on clinical course  Since start of IV antibiotics cellulitis much improving on IV clindamycin p o  Flagyl  Imaging negative of OM  Order placed for PT  Prior to admission, pt was independent w/ all functional mobility w/ crutches, ambulated community distances and elevations, lived in multi-level home, had several PRINCE no railing and lived with spouse and children  Upon evaluation: Pt requires no assistance for bed mobility, transfers, and ambulation with crutches    Pt's clinical presentation is currently unstable/unpredictable given the  gait deviations and orthopedic restrictions combined with medical complications of multiple readmissions and sypmtoms of wound infection  From PT/mobility standpoint, recommendation at time of d/c would be anticipate no needs   Patient is cleared by PT functionally for d/c to home with family support  The following objective measures were performed on IE: Barthel Index 100/100;   Comorbidities affecting pt's physical performance at time of assessment include: orthopedic surgery and RSD  Personal factors affecting pt at time of IE include: unable to perform caregiver support/tasks and steps to enter environment       Rubens Dang, PT, MPT, GCS

## 2019-08-25 PROBLEM — T81.41XA INFECTION OF SUPERFICIAL INCISIONAL SURGICAL SITE AFTER PROCEDURE: Status: RESOLVED | Noted: 2019-08-22 | Resolved: 2019-08-25

## 2019-08-25 LAB
ANION GAP SERPL CALCULATED.3IONS-SCNC: 7 MMOL/L (ref 4–13)
BACTERIA WND AEROBE CULT: ABNORMAL
BACTERIA WND AEROBE CULT: ABNORMAL
BUN SERPL-MCNC: 11 MG/DL (ref 5–25)
CALCIUM SERPL-MCNC: 9.1 MG/DL (ref 8.3–10.1)
CHLORIDE SERPL-SCNC: 103 MMOL/L (ref 100–108)
CO2 SERPL-SCNC: 26 MMOL/L (ref 21–32)
CREAT SERPL-MCNC: 0.78 MG/DL (ref 0.6–1.3)
ERYTHROCYTE [DISTWIDTH] IN BLOOD BY AUTOMATED COUNT: 11.9 % (ref 11.6–15.1)
GFR SERPL CREATININE-BSD FRML MDRD: 90 ML/MIN/1.73SQ M
GLUCOSE SERPL-MCNC: 98 MG/DL (ref 65–140)
GRAM STN SPEC: ABNORMAL
GRAM STN SPEC: ABNORMAL
HCT VFR BLD AUTO: 40.2 % (ref 34.8–46.1)
HGB BLD-MCNC: 13.5 G/DL (ref 11.5–15.4)
MCH RBC QN AUTO: 31.3 PG (ref 26.8–34.3)
MCHC RBC AUTO-ENTMCNC: 33.6 G/DL (ref 31.4–37.4)
MCV RBC AUTO: 93 FL (ref 82–98)
PLATELET # BLD AUTO: 253 THOUSANDS/UL (ref 149–390)
PMV BLD AUTO: 9.2 FL (ref 8.9–12.7)
POTASSIUM SERPL-SCNC: 3.8 MMOL/L (ref 3.5–5.3)
RBC # BLD AUTO: 4.31 MILLION/UL (ref 3.81–5.12)
SODIUM SERPL-SCNC: 136 MMOL/L (ref 136–145)
WBC # BLD AUTO: 6.08 THOUSAND/UL (ref 4.31–10.16)

## 2019-08-25 PROCEDURE — 85027 COMPLETE CBC AUTOMATED: CPT | Performed by: STUDENT IN AN ORGANIZED HEALTH CARE EDUCATION/TRAINING PROGRAM

## 2019-08-25 PROCEDURE — 80048 BASIC METABOLIC PNL TOTAL CA: CPT | Performed by: STUDENT IN AN ORGANIZED HEALTH CARE EDUCATION/TRAINING PROGRAM

## 2019-08-25 PROCEDURE — 99232 SBSQ HOSP IP/OBS MODERATE 35: CPT | Performed by: INTERNAL MEDICINE

## 2019-08-25 PROCEDURE — NC001 PR NO CHARGE: Performed by: PODIATRIST

## 2019-08-25 PROCEDURE — 99024 POSTOP FOLLOW-UP VISIT: CPT | Performed by: PODIATRIST

## 2019-08-25 RX ORDER — ONDANSETRON HYDROCHLORIDE 8 MG/1
8 TABLET, FILM COATED ORAL EVERY 8 HOURS PRN
Qty: 20 TABLET | Refills: 0 | Status: SHIPPED | OUTPATIENT
Start: 2019-08-25

## 2019-08-25 RX ORDER — SULFAMETHOXAZOLE AND TRIMETHOPRIM 800; 160 MG/1; MG/1
1 TABLET ORAL EVERY 12 HOURS SCHEDULED
Status: DISCONTINUED | OUTPATIENT
Start: 2019-08-25 | End: 2019-08-25 | Stop reason: HOSPADM

## 2019-08-25 RX ORDER — SULFAMETHOXAZOLE AND TRIMETHOPRIM 800; 160 MG/1; MG/1
1 TABLET ORAL EVERY 12 HOURS SCHEDULED
Qty: 28 TABLET | Refills: 0 | Status: SHIPPED | OUTPATIENT
Start: 2019-08-25 | End: 2019-09-08

## 2019-08-25 RX ADMIN — CLINDAMYCIN PHOSPHATE 600 MG: 600 INJECTION, SOLUTION INTRAVENOUS at 04:24

## 2019-08-25 RX ADMIN — ONDANSETRON 4 MG: 2 INJECTION INTRAMUSCULAR; INTRAVENOUS at 11:04

## 2019-08-25 RX ADMIN — GABAPENTIN 300 MG: 300 CAPSULE ORAL at 08:43

## 2019-08-25 RX ADMIN — ENOXAPARIN SODIUM 40 MG: 40 INJECTION SUBCUTANEOUS at 08:43

## 2019-08-25 RX ADMIN — VARENICLINE TARTRATE 1 MG: 0.5 TABLET, FILM COATED ORAL at 08:44

## 2019-08-25 NOTE — PROGRESS NOTES
Progress Note - Roland Ruano 51 y o  female MRN: [id number]    Unit/Bed#: E5 -01 Encounter: [id number]      Subjective: The patient feels much better today  She has had no further nausea  She denies foot pain  She has had no chest pain or shortness of breath  She is eating and sleeping well  Physical Exam:   Temp:  [97 9 °F (36 6 °C)-98 5 °F (36 9 °C)] 97 9 °F (36 6 °C)  HR:  [66-70] 70  Resp:  [17-18] 18  BP: (100-114)/(59-68) 100/59    Gen:  Well-developed, well-nourished, in no distress  Neck:  Supple  No lymphadenopathy, goiter, or bruit  Heart:  Regular rhythm  No murmur, gallop, or rub  Lungs:  Clear to auscultation and percussion  No wheezing, rales, or rhonchi   Abd:  Soft with active bowel sounds  No mass, tenderness, or organomegaly  Extremities:  No clubbing, cyanosis, or edema  No calf tenderness  Neuro:  Alert and oriented  No focal sign  Skin:  Surgical incisions look much improved since the time of admission          Patient Active Problem List   Diagnosis    Hallux valgus (acquired), left foot    Painful orthopaedic hardware ([address] )    Hallux rigidus of left foot    Restless legs syndrome (RLS)    Former light tobacco smoker       Assessment/Plan:  1  Postop wound infection, steadily improving  2  Reflex sympathetic dystrophy  3  Nausea secondary to metronidazole, resolved    The patient is doing well  The case was discussed with Podiatry  She is ready for transition to oral antibiotics  She will be discharged today  Appropriate follow-up has been arranged

## 2019-08-25 NOTE — PLAN OF CARE
Problem: Potential for Falls  Goal: Patient will remain free of falls  Description  INTERVENTIONS:  - Assess patient frequently for physical needs  -  Identify cognitive and physical deficits and behaviors that affect risk of falls    -  [place] fall precautions as indicated by assessment   - Educate patient/family on patient safety including physical limitations  - Instruct patient to call for assistance with activity based on assessment  - Modify environment to reduce risk of injury  - Consider OT/PT consult to assist with strengthening/mobility  8/25/2019 1207 by Alida Miller RN  Outcome: Adequate for Discharge  8/25/2019 0746 by Alida Miller RN  Outcome: Progressing

## 2019-08-25 NOTE — DISCHARGE SUMMARY
Discharge Summary -   Zi Valdez 51 y o  female MRN: [id number]  Unit/Bed#: E5 -01 Encounter: [id number]    Admission Date: 8/22/2019     Admitting Diagnosis: Cellulitis and abscess of foot [L03 119, L02 619]    HPI:     She has been followed by Dr Sadie Bach after a left foot removal of hardware procedure  She was seen outpatient with non-improving cellulitis of both incisions of the left foot  She failed oral doxycycline  She presented to the hospital with mild periwound erythema  She felt nauseous as well on admission    Procedures Performed: * Cannot find OR case [facility] Course: She was placed on IV clindamycin and PO flagyl upon admission  Her Xray was negative for Osteomyelitis, or soft tissue emphysema  Her blood cultures were negative and wound culture grew stenotrophomonas maltophilia which is sensitive to Trimethoprim/sulfamethoxazole  Her nausea was managed while in house with IV zofran  She had the most improvement with discontinuation of PO flagyl  Significant Findings, Care, Treatment and Services Provided: Internal medicine was consulted for possible pre-operative clearance, however this was not needed with improvement of symptoms  Complications: none    Discharge Diagnosis: cellulitis of left foot post operative incisions    Condition at Discharge: good     Discharge instructions/Information to patient and family:   See after visit summary for information provided to patient and family  Provisions for Follow-Up Care/Important appointments:    She should keep the dressing Clean, dry and intact until outpatient follow up visit  Please complete full course of PO DS Bactrim for cellulitis  See after visit summary for information related to follow-up care and any pertinent home health orders  Disposition: See After Visit Summary for discharge disposition information      Planned Readmission: No    Discharge Statement   I spent 30 minutes discharging the patient  This time was spent on the day of discharge  I had direct contact with the patient on the day of discharge  The details of this patient's discharge     Discharge Medications:  See after visit summary for reconciled discharge medications provided to patient and family  PO DS bactrim BID 14 days

## 2019-08-25 NOTE — DISCHARGE INSTRUCTIONS
Discharge Instructions - Podiatry    Weight Bearing Status:   Weight bearing as tolerated                                          Pain: Continue analgesics as directed    F/u appointment Instructions: Please make an appointment within one week of discharge with Dr Israel Neal  Contact sooner if any increase in pain, or signs of infection occur    Wound Care: please keep dressing clean, dry and intact until next visit with Dr Israel Neal  Please take Bactrim P O  Two times per day for 2 weeks  It is best to take with food and supplement with a probiotic  Since you have not taken this medication before if there are any allergic reactions including hives, difficulty breathing or swelling of the lips, please take Benadryl and immediately go to the emergency room

## 2019-08-25 NOTE — PROGRESS NOTES
Progress Note - Podiatry  Ashley Aquino 51 y o  female MRN: [id number]  Unit/Bed#: E5 -01 Encounter: [id number]    Assessment:  1  left foot post operative infection, s/p removal of hardware of the hallux, 1MPJ arthrodesis and lateral foot attempted remocal of hardware  2  History of smoking  3  Restless leg syndrome  4  CRPS  5  fibromyalgia    Plan:  · Continue IV clindamycin while in house  · Blood culture results show no growth at 48 hours  · Wound culture shows growth of Stenotrophomonas maltophilia, Bactrim typically covers   · Stable for discharge, with P o  bactrim  · NWB at home until first visit with Dr Mahsa Howe  ·     Subjective/Objective   Chief Complaint: No chief complaint on file  Subjective: 51 y o  y/o female was seen and evaluated at bedside  NAD  No acute events overnight  She has tolerated the IV clindamycin well since discontinuing P O  Flagyl  However the most recent culture revealed microbe more sensitive to bactrim  Blood pressure 100/59, pulse 70, temperature 97 9 °F (36 6 °C), temperature source Temporal, resp  rate 18, SpO2 96 %  ,There is no height or weight on file to calculate BMI  Invasive Devices     Peripheral Intravenous Line            Peripheral IV 08/22/19 Right Forearm 2 days                Physical Exam:   General: Alert, cooperative and no distress  Lungs: Non labored breathing  Abdomen: Soft, non-tender  Extremity:     NVS at baseline B/l  MSK function at baseline B/l  No calf tenderness noted B/l  1st dorsal ulcer still open with minimal drainage  5th incision site appears coapt at this time  No surrounding erythema noted at this time  Lab, Imaging and other studies:   I have personally reviewed pertinent lab results    , CBC:   Lab Results   Component Value Date    WBC 6 08 08/25/2019    HGB 13 5 08/25/2019    HCT 40 2 08/25/2019    MCV 93 08/25/2019     08/25/2019    MCH 31 3 08/25/2019    MCHC 33 6 08/25/2019    RDW 11 9 08/25/2019 MPV 9 2 08/25/2019   , CMP:   Lab Results   Component Value Date    SODIUM 136 08/25/2019    K 3 8 08/25/2019     08/25/2019    CO2 26 08/25/2019    BUN 11 08/25/2019    CREATININE 0 78 08/25/2019    CALCIUM 9 1 08/25/2019    EGFR 90 08/25/2019   , Coagulation: No results found for: PT, INR, APTT    Imaging: I have personally reviewed pertinent films in PACS  EKG, Pathology, and Other Studies: I have personally reviewed pertinent reports  Portions of the record may have been created with voice recognition software  Occasional wrong word or "sound a like" substitutions may have occurred due to the inherent limitations of voice recognition software  Read the chart carefully and recognize, using context, where substitutions have occurred

## 2019-08-25 NOTE — NURSING NOTE
Rn reviewed pt's discharge instructions  Pt was educated on her new abx times and dose to take  Also educated pt to continue with yogurt while taking ABX  Pt educated on s/s of infection  Reviewed days times and doses of pt's medications

## 2019-08-25 NOTE — PLAN OF CARE
Problem: Potential for Falls  Goal: Patient will remain free of falls  Description  INTERVENTIONS:  - Assess patient frequently for physical needs  -  Identify cognitive and physical deficits and behaviors that affect risk of falls    -  [place] fall precautions as indicated by assessment   - Educate patient/family on patient safety including physical limitations  - Instruct patient to call for assistance with activity based on assessment  - Modify environment to reduce risk of injury  - Consider OT/PT consult to assist with strengthening/mobility  Outcome: Progressing

## 2019-08-26 NOTE — UTILIZATION REVIEW
Continued Stay Review    Date:        FOR   8/24/2019                          Current Patient Class:    IP  Current Level of Care:    MED  SURG    HPI:48 y o  female initially admitted on    8/22/2019     Assessment/Plan:    Pt  Initially  Admitted    With post op foot  Infection  Need to cont  IP acute  LOC  D/T cont  [facility],   Need to  Follow   [facility] and monitor  Labs  Remains  NWB   LLE  Pertinent Labs/Diagnostic Results:   Results from last 7 days   Lab Units 08/25/19  0521 08/24/19  0537 08/23/19  0513 08/22/19  1322   WBC Thousand/uL 6 08 6 00 6 18 7 04   HEMOGLOBIN g/dL 13 5 13 6 13 2 14 5   HEMATOCRIT % 40 2 41 6 40 0 45 8   PLATELETS Thousands/uL 253 260 277 290   NEUTROS ABS Thousands/µL  --   --   --  3 81         Results from last 7 days   Lab Units 08/25/19  0521 08/24/19  0537 08/23/19  0513 08/22/19  1335   SODIUM mmol/L 136 139 141 139   POTASSIUM mmol/L 3 8 4 0 4 5 4 9   CHLORIDE mmol/L 103 104 105 106   CO2 mmol/L 26 29 29 25   ANION GAP mmol/L 7 6 7 8   BUN mg/dL 11 13 14 14   CREATININE mg/dL 0 78 0 86 0 80 0 66   EGFR ml/min/1 73sq m 90 80 87 104   CALCIUM mg/dL 9 1 9 1 9 3 9 2     Results from last 7 days   Lab Units 08/22/19  1335   AST U/L 30   ALT U/L 24   ALK PHOS U/L 71   TOTAL PROTEIN g/dL 7 1   ALBUMIN g/dL 3 7   TOTAL BILIRUBIN mg/dL 0 32         Results from last 7 days   Lab Units 08/25/19  0521 08/24/19  0537 08/23/19  0513 08/22/19  1335   GLUCOSE RANDOM mg/dL 98 100 94 85           Vital Signs:   97 9 °F (36 6 °C)  70  18  100/59  96 %  None (Room air)         Medications:   Scheduled Meds:    8/24/2019  IV  Cleocin  Q8  Hrs  SQ lovenox  Daily  Po flagyl  Q8 hrs  mirapex  Daily  Bactrim  Q 12 hrs  chantix  Bid  IV  zofran PRN  (  x3   8/24)    Discharge Plan:   PT  D/c  Home  8/25/2019    Network Utilization Review Department  Phone: [phone]; Fax [phone]  [email or web address]  ATTENTION: Please call with any questions or concerns to [phone]  and carefully listen to the prompts so that you are directed to the right person  Send all requests for admission clinical reviews, approved or denied determinations and any other requests to fax [phone]   All voicemails are confidential

## 2019-08-27 LAB
BACTERIA BLD CULT: NORMAL
BACTERIA BLD CULT: NORMAL

## 2019-08-29 ENCOUNTER — OFFICE VISIT (OUTPATIENT)
Dept: PODIATRY | Facility: CLINIC | Age: 49
End: 2019-08-29

## 2019-08-29 VITALS
SYSTOLIC BLOOD PRESSURE: 110 MMHG | HEIGHT: 64 IN | DIASTOLIC BLOOD PRESSURE: 60 MMHG | BODY MASS INDEX: 17.75 KG/M2 | WEIGHT: 104 LBS

## 2019-08-29 DIAGNOSIS — M20.22 HALLUX RIGIDUS OF LEFT FOOT: ICD-10-CM

## 2019-08-29 DIAGNOSIS — T81.31XD POSTOPERATIVE WOUND DEHISCENCE, SUBSEQUENT ENCOUNTER: Primary | ICD-10-CM

## 2019-08-29 PROCEDURE — 99024 POSTOP FOLLOW-UP VISIT: CPT | Performed by: PODIATRIST

## 2019-08-29 NOTE — PROGRESS NOTES
POST-OP VISIT    Francine Moe  1970      Subjective: Patient here for post-op appointment following left 1st MTPJ fusion  Patient denies significant pain at the surgical site, active strikethrough drainage, fevers, chills, nightsweats, SOB, chest pain, nor calf pain  The patient is in good spirits  Patient relates compliance with post-op instructions  Patient is 4 weeks post-op  Objective: The patient appears in NAD / non-toxic  Primary dressing and splint/cast taken down for wound inspection  VSS  No signs of infection  No active drainage  Normal post-op edema  No necrosis, dehiscence  Infection resolved  Both incisions are dry without any drainage or cellulitis  Assessment/Plan:     Diagnoses and all orders for this visit:    Postoperative wound dehiscence, subsequent encounter    Hallux rigidus of left foot  -     XR foot 2 vw left; Future        1  Patient is stable post-op  2  Discussed compliance with weight bearing instructions, incision care, and rest  Call if any increase in pain, fevers, calf pain, shortness of breath, or general distress is noted  Patient instructed to go to ER if call is not returned immediately  3  Infection resolved  Keep scabs covered to protect  WB in boot for 2 more weeks  After 6 total weeks transition to sneaker

## 2019-09-19 ENCOUNTER — OFFICE VISIT (OUTPATIENT)
Dept: PODIATRY | Facility: CLINIC | Age: 49
End: 2019-09-19

## 2019-09-19 ENCOUNTER — APPOINTMENT (OUTPATIENT)
Dept: RADIOLOGY | Facility: CLINIC | Age: 49
End: 2019-09-19
Payer: COMMERCIAL

## 2019-09-19 VITALS
WEIGHT: 105 LBS | SYSTOLIC BLOOD PRESSURE: 110 MMHG | BODY MASS INDEX: 17.93 KG/M2 | DIASTOLIC BLOOD PRESSURE: 61 MMHG | HEIGHT: 64 IN

## 2019-09-19 DIAGNOSIS — M20.22 HALLUX RIGIDUS OF LEFT FOOT: ICD-10-CM

## 2019-09-19 DIAGNOSIS — M20.22 HALLUX RIGIDUS, LEFT FOOT: Primary | ICD-10-CM

## 2019-09-19 PROCEDURE — 99024 POSTOP FOLLOW-UP VISIT: CPT | Performed by: PODIATRIST

## 2019-09-19 PROCEDURE — 73630 X-RAY EXAM OF FOOT: CPT

## 2019-09-19 NOTE — PROGRESS NOTES
POST-OP VISIT    Yoseph Mirza  1970  DOS 8/1/19: Left 1st MTPJ fusion    Subjective: Patient here for post-op appointment following left great toe fusion  Patient denies significant pain at the surgical site, active strikethrough drainage, fevers, chills, nightsweats, SOB, chest pain, nor calf pain  The patient is in good spirits  Patient relates compliance with post-op instructions  Patient is 6 weeks post-op  Objective: The patient appears in NAD / non-toxic  Primary dressing and splint/cast taken down for wound inspection  VSS  No signs of infection  No active drainage  Normal post-op edema  No necrosis, dehiscence  Left 1st MTPJ is fused  Mild edema  And tenderness, overall stable  Minor cutaneous anesthesia around incision  On stance, toe is mildly elevated off the ground but no pain  No sign of infection    Assessment/Plan:     Diagnoses and all orders for this visit:    Hallux rigidus, left foot        1  Patient is stable post-op  2  Xray shows fused MTPJ with hardware intact

## 2019-10-31 ENCOUNTER — OFFICE VISIT (OUTPATIENT)
Dept: PODIATRY | Facility: CLINIC | Age: 49
End: 2019-10-31

## 2019-10-31 VITALS
BODY MASS INDEX: 17.93 KG/M2 | HEIGHT: 64 IN | DIASTOLIC BLOOD PRESSURE: 60 MMHG | SYSTOLIC BLOOD PRESSURE: 112 MMHG | WEIGHT: 105 LBS

## 2019-10-31 DIAGNOSIS — M20.22 HALLUX RIGIDUS OF LEFT FOOT: Primary | ICD-10-CM

## 2019-10-31 PROCEDURE — 99024 POSTOP FOLLOW-UP VISIT: CPT | Performed by: PODIATRIST

## 2019-10-31 NOTE — PROGRESS NOTES
POST-OP VISIT    Ford Zhengrigan  1970      Subjective: Patient here for post-op appointment following left 1st MTPJ fusion  Patient denies significant pain at the surgical site, active strikethrough drainage, fevers, chills, nightsweats, SOB, chest pain, nor calf pain  The patient is in good spirits  Patient relates compliance with post-op instructions  Patient is 3 months post-op  Objective: The patient appears in NAD / non-toxic  Primary dressing and splint/cast taken down for wound inspection  VSS  No signs of infection  No active drainage  Normal post-op edema  No necrosis, dehiscence  Left 1st MTPJ is fused, minimal tenderness   Single heel raise normal without pain  On stance, toe 2mm elevated off floor  No edema today    Assessment/Plan:     Diagnoses and all orders for this visit:    Hallux rigidus of left foot        1  Patient is stable post-op  She states she is very active, painting, and states her pain is so much better than preop  She is very pleased with her outcome  She may call as needed

## 2025-05-03 ENCOUNTER — HOSPITAL ENCOUNTER (EMERGENCY)
Facility: HOSPITAL | Age: 55
Discharge: HOME | End: 2025-05-03
Attending: EMERGENCY MEDICINE
Payer: COMMERCIAL

## 2025-05-03 VITALS
OXYGEN SATURATION: 95 % | TEMPERATURE: 98.2 F | RESPIRATION RATE: 18 BRPM | HEART RATE: 65 BPM | DIASTOLIC BLOOD PRESSURE: 73 MMHG | SYSTOLIC BLOOD PRESSURE: 127 MMHG

## 2025-05-03 DIAGNOSIS — M54.31 SCIATICA OF RIGHT SIDE: Primary | ICD-10-CM

## 2025-05-03 DIAGNOSIS — M54.9 BACK PAIN: ICD-10-CM

## 2025-05-03 LAB
EXT PREGNANCY TEST URINE: NEGATIVE
EXT. CONTROL: NORMAL

## 2025-05-03 PROCEDURE — 96375 TX/PRO/DX INJ NEW DRUG ADDON: CPT

## 2025-05-03 PROCEDURE — 96374 THER/PROPH/DIAG INJ IV PUSH: CPT

## 2025-05-03 PROCEDURE — 99284 EMERGENCY DEPT VISIT MOD MDM: CPT

## 2025-05-03 PROCEDURE — 99283 EMERGENCY DEPT VISIT LOW MDM: CPT

## 2025-05-03 PROCEDURE — 81025 URINE PREGNANCY TEST: CPT

## 2025-05-03 RX ORDER — DIAZEPAM 10 MG/2ML
5 INJECTION, SOLUTION INTRAMUSCULAR; INTRAVENOUS ONCE
Status: COMPLETED | OUTPATIENT
Start: 2025-05-03 | End: 2025-05-03

## 2025-05-03 RX ORDER — POLYETHYLENE GLYCOL 3350 17 G/17G
17 POWDER, FOR SOLUTION ORAL DAILY
Qty: 119 G | Refills: 0 | Status: SHIPPED | OUTPATIENT
Start: 2025-05-03 | End: 2025-05-10

## 2025-05-03 RX ORDER — NAPROXEN 500 MG/1
500 TABLET ORAL 2 TIMES DAILY WITH MEALS
Qty: 14 TABLET | Refills: 0 | Status: SHIPPED | OUTPATIENT
Start: 2025-05-03 | End: 2025-05-10

## 2025-05-03 RX ORDER — NAPROXEN 500 MG/1
500 TABLET ORAL 2 TIMES DAILY WITH MEALS
Qty: 14 TABLET | Refills: 0 | Status: SHIPPED | OUTPATIENT
Start: 2025-05-03 | End: 2025-05-03

## 2025-05-03 RX ORDER — POLYETHYLENE GLYCOL 3350 17 G/17G
17 POWDER, FOR SOLUTION ORAL DAILY
Qty: 119 G | Refills: 0 | Status: SHIPPED | OUTPATIENT
Start: 2025-05-03 | End: 2025-05-03

## 2025-05-03 RX ORDER — PANTOPRAZOLE SODIUM 40 MG/1
40 TABLET, DELAYED RELEASE ORAL DAILY
Qty: 7 TABLET | Refills: 0 | Status: SHIPPED | OUTPATIENT
Start: 2025-05-03 | End: 2025-05-10

## 2025-05-03 RX ORDER — METHOCARBAMOL 500 MG/1
500 TABLET, FILM COATED ORAL 2 TIMES DAILY
Qty: 8 TABLET | Refills: 0 | Status: SHIPPED | OUTPATIENT
Start: 2025-05-03 | End: 2025-05-07

## 2025-05-03 RX ORDER — ACETAMINOPHEN 325 MG/1
650 TABLET ORAL ONCE
Status: COMPLETED | OUTPATIENT
Start: 2025-05-03 | End: 2025-05-03

## 2025-05-03 RX ORDER — LIDOCAINE 50 MG/G
1 PATCH TOPICAL ONCE
Status: DISCONTINUED | OUTPATIENT
Start: 2025-05-03 | End: 2025-05-03 | Stop reason: HOSPADM

## 2025-05-03 RX ORDER — PANTOPRAZOLE SODIUM 40 MG/1
40 TABLET, DELAYED RELEASE ORAL DAILY
Qty: 7 TABLET | Refills: 0 | Status: SHIPPED | OUTPATIENT
Start: 2025-05-03 | End: 2025-05-03

## 2025-05-03 RX ORDER — KETOROLAC TROMETHAMINE 30 MG/ML
15 INJECTION, SOLUTION INTRAMUSCULAR; INTRAVENOUS ONCE
Status: COMPLETED | OUTPATIENT
Start: 2025-05-03 | End: 2025-05-03

## 2025-05-03 RX ORDER — METHOCARBAMOL 500 MG/1
500 TABLET, FILM COATED ORAL 2 TIMES DAILY
Qty: 8 TABLET | Refills: 0 | Status: SHIPPED | OUTPATIENT
Start: 2025-05-03 | End: 2025-05-03

## 2025-05-03 RX ADMIN — KETOROLAC TROMETHAMINE 15 MG: 30 INJECTION, SOLUTION INTRAMUSCULAR; INTRAVENOUS at 15:59

## 2025-05-03 RX ADMIN — DIAZEPAM 5 MG: 10 INJECTION, SOLUTION INTRAMUSCULAR; INTRAVENOUS at 15:55

## 2025-05-03 RX ADMIN — ACETAMINOPHEN 650 MG: 325 TABLET, FILM COATED ORAL at 15:53

## 2025-05-03 RX ADMIN — LIDOCAINE 1 PATCH: 700 PATCH TOPICAL at 15:55

## 2025-05-03 NOTE — ED PROVIDER NOTES
Time reflects when diagnosis was documented in both MDM as applicable and the Disposition within this note       Time User Action Codes Description Comment    5/3/2025  4:17 PM Ana Estrada Add [M54.31] Sciatica of right side     5/3/2025  4:17 PM Ana Estrada Add [M54.9] Back pain           ED Disposition       ED Disposition   Discharge    Condition   Stable    Date/Time   Sat May 3, 2025  4:17 PM    Comment   Crystal Anders discharge to home/self care.                   Assessment & Plan       Medical Decision Making  The patient is a 54-year-old female with a chronic spinal history presenting with a flareup of right-sided low back pain radiating down the right posterior leg. Patient's physical exam is notable for a positive straight leg raise on the right, without focal neurological deficits or signs/symptoms concerning for cauda equina syndrome. Given her stable exam, lack of red flag symptoms and presentation most consistent with sciatica, will proceed with symptomatic treatment in the ED.    Patient reported improvement in her symptoms. Medications sent to the pharmacy, comprehensive spine program referrals been placed. Patient was instructed to continue coordinating her care with neurology and PCP. Strict return precautions were discussed and patient verbalized understanding agreement to the plan. Patient discharged in no acute distress and ambulated out of the emergency department with stable gait.    Amount and/or Complexity of Data Reviewed  Labs: ordered.    Risk  OTC drugs.  Prescription drug management.        ED Course as of 05/03/25 1641   Sat May 03, 2025   1616 Patient reports improvement in her symptoms. Patient's sister is at the bedside. Patient is communicative and eating. She is agreeable with plan to follow up with comprehensive spine program. Will discontinue flexeril and initiate robaxin. Strict return precautions were discussed and patient verbalized understanding agreement to the plan.    1618 Will proceed with one week of naproxen. Kidney functions from  reviewed.  Protonix also prescribed.    Patient was made aware to avoid taking Robaxin with other sedating medications and to not drink, drive, operate heavy machinery while taking the medication.       Medications   lidocaine (LIDODERM) 5 % patch 1 patch (1 patch Topical Medication Applied 5/3/25 1555)   ketorolac (TORADOL) injection 15 mg (15 mg Intravenous Given 5/3/25 1559)   acetaminophen (TYLENOL) tablet 650 mg (650 mg Oral Given 5/3/25 1553)   diazepam (VALIUM) injection 5 mg (5 mg Intravenous Given 5/3/25 1555)       ED Risk Strat Scores                    No data recorded        SBIRT 22yo+      Flowsheet Row Most Recent Value   Initial Alcohol Screen: US AUDIT-C     1. How often do you have a drink containing alcohol? 0 Filed at: 2025   2. How many drinks containing alcohol do you have on a typical day you are drinking?  0 Filed at: 2025   3a. Male UNDER 65: How often do you have five or more drinks on one occasion? 0 Filed at: 2025   3b. FEMALE Any Age, or MALE 65+: How often do you have 4 or more drinks on one occassion? 0 Filed at: 2025   Audit-C Score 0 Filed at: 2025   AISHA: How many times in the past year have you...    Used an illegal drug or used a prescription medication for non-medical reasons? Never Filed at: 2025                            History of Present Illness       Chief Complaint   Patient presents with    Back Pain     Pt has right sided back pain down her leg, pt said she think its sciatica, pt was on steroids already with no relief        Past Medical History:   Diagnosis Date    Bronchitis     Neuropathy     PONV (postoperative nausea and vomiting)     Restless leg syndrome       Past Surgical History:   Procedure Laterality Date    BACK SURGERY      Lumbar X2    CERVICAL FUSION       SECTION      X1    CHOLECYSTECTOMY       COLONOSCOPY      CYST REMOVAL      Scalp, lorna middle fingers    FOOT SURGERY Left     X2    HYSTERECTOMY      MA ARTHRODESIS GREAT TOE METATARSOPHALANGEAL JOINT Left 2019    Procedure: FOOT 1ST MTPJ ARTHRODESIS / FUSION;  Surgeon: Blayne Wick DPM;  Location: [facility];  Service: Podiatry    MA REMOVAL IMPLANT DEEP Left 2019    Procedure: FOOT GREAT TOE REMOVAL OF HARDWARE;  Surgeon: Blayne Wick DPM;  Location: [facility];  Service: Podiatry    TONSILLECTOMY      and adeniodectomy      History reviewed. No pertinent family history.   Social History     Tobacco Use    Smoking status: Former     Current packs/day: 0.00     Average packs/day: 1 pack/day for 30.0 years (30.0 ttl pk-yrs)     Types: Cigarettes     Start date: 1989     Quit date: 2019     Years since quittin.8    Smokeless tobacco: Never    Tobacco comments:     Quitting-taking Chantix   Substance Use Topics    Alcohol use: Yes     Comment: Socially    Drug use: Never      E-Cigarette/Vaping      E-Cigarette/Vaping Substances      I have reviewed and agree with the history as documented.     The patient is a 54-year-old female with a history of degenerative disc disease, status post cervical spinal fusion, lumbar postlaminectomy syndrome, chronic pain syndrome, radiculopathy, and sciatica, amongst others that are available in chart review, who is presenting to the emergency department with right-sided low back pain radiating from the buttock down the posterior aspect of the right lower extremity. She denies saddle anesthesia, leg weakness or numbness, bowel or bladder dysfunction, fever or chills.  She states the pain is similar to prior sciatica episodes.  She reports that she recently finished a Medrol Dosepak for her symptoms.  Patient denies any urinary frequency, dysuria, hematuria, or any other urinary symptoms.      Back Pain  Associated symptoms: no abdominal pain, no chest pain, no dysuria, no fever, no  headaches and no weakness        Review of Systems   Constitutional:  Negative for chills and fever.   HENT:  Negative for ear pain and sore throat.    Eyes:  Negative for pain and visual disturbance.   Respiratory:  Negative for cough and shortness of breath.    Cardiovascular:  Negative for chest pain and palpitations.   Gastrointestinal:  Negative for abdominal pain, diarrhea, nausea and vomiting.   Genitourinary:  Negative for dysuria and hematuria.   Musculoskeletal:  Positive for back pain. Negative for arthralgias, gait problem, myalgias, neck pain and neck stiffness.   Skin:  Negative for color change and rash.   Neurological:  Negative for dizziness, tremors, seizures, syncope, weakness, light-headedness and headaches.   All other systems reviewed and are negative.          Objective       ED Triage Vitals   Temperature Pulse Blood Pressure Respirations SpO2 Patient Position - Orthostatic VS   05/03/25 1512 05/03/25 1512 05/03/25 1512 05/03/25 1512 05/03/25 1512 --   98.2 °F (36.8 °C) 97 142/67 18 98 %       Temp src Heart Rate Source BP Location FiO2 (%) Pain Score    -- 05/03/25 1600 -- -- 05/03/25 1553     Monitor   10 - Worst Possible Pain      Vitals      Date and Time Temp Pulse SpO2 Resp BP Pain Score FACES Pain Rating User   05/03/25 1600 -- 65 95 % 18 127/73 -- --    05/03/25 1559 -- -- -- -- -- 10 - Worst Possible Pain --    05/03/25 1553 -- -- -- -- -- 10 - Worst Possible Pain --    05/03/25 1512 98.2 °F (36.8 °C) 97 98 % 18 142/67 -- -- MARGARITA            Physical Exam  Vitals and nursing note reviewed.   Constitutional:       General: She is not in acute distress.     Appearance: Normal appearance. She is well-developed. She is not ill-appearing.   HENT:      Head: Normocephalic and atraumatic.      Mouth/Throat:      Mouth: Mucous membranes are moist.   Eyes:      Conjunctiva/sclera: Conjunctivae normal.   Cardiovascular:      Rate and Rhythm: Normal rate and regular rhythm.      Heart  sounds: No murmur heard.  Pulmonary:      Effort: Pulmonary effort is normal. No respiratory distress.      Breath sounds: Normal breath sounds.   Abdominal:      Palpations: Abdomen is soft.      Tenderness: There is no abdominal tenderness.   Musculoskeletal:         General: No swelling.      Cervical back: Normal and neck supple.      Thoracic back: Normal.      Lumbar back: No swelling, tenderness or bony tenderness. Normal range of motion. Positive right straight leg raise test. Negative left straight leg raise test.      Comments: Patient ambulated to hospital bed without assistance or difficulty. The CTLS back appears normal in contour, with no visible deformities, swelling or discoloration. The patient exhibits normal posture, with no obvious guarding or asymmetry. Tenderness is noted over the right gluteal region without overlying erythema, swelling or warmth.  No tenderness over the spinous processes.  No palpable masses, step-offs or warmth are present.    Skin:     General: Skin is warm and dry.      Capillary Refill: Capillary refill takes less than 2 seconds.      Findings: No erythema.   Neurological:      General: No focal deficit present.      Mental Status: She is alert and oriented to person, place, and time. Mental status is at baseline.      Sensory: Sensation is intact. No sensory deficit.      Motor: Motor function is intact.      Coordination: Coordination is intact.      Gait: Gait is intact. Gait normal.      Comments: Strength in UE and LE 5/5, sensation intact, palpable pulses.   Psychiatric:         Mood and Affect: Mood normal.         Results Reviewed       Procedure Component Value Units Date/Time    POCT pregnancy, urine [[id number]]  (Normal) Collected: 05/03/25 1557    Lab Status: Final result Updated: 05/03/25 1557     EXT Preg Test, Ur Negative     Control Valid            No orders to display       Procedures    ED Medication and Procedure Management   Prior to Admission  Medications   Prescriptions Last Dose Informant Patient Reported? Taking?   CHANTIX STARTING MONTH ABDIRASHID 0.5 MG X 11 & 1 MG X 42 tablet   Yes No   Sig: Take by mouth 2 (two) times a day    albuterol (PROVENTIL HFA,VENTOLIN HFA) 90 mcg/act inhaler   Yes No   Sig: Inhale 1 puff every 4 (four) hours as needed    gabapentin (NEURONTIN) 300 mg capsule   Yes No   Sig: Take 300 mg by mouth 3 (three) times a day    multivitamin (THERAGRAN) TABS   Yes No   Sig: Take 1 tablet by mouth daily   ondansetron (ZOFRAN) 8 mg tablet   No No   Sig: Take 1 tablet (8 mg total) by mouth every 8 (eight) hours as needed for nausea or vomiting   pramipexole (MIRAPEX) 1 mg tablet   Yes No   Sig: Take 1 mg by mouth daily at bedtime       Facility-Administered Medications: None     Discharge Medication List as of 5/3/2025  4:21 PM        START taking these medications    Details   methocarbamol (ROBAXIN) 500 mg tablet Take 1 tablet (500 mg total) by mouth 2 (two) times a day for 4 days, Starting Sat 5/3/2025, Until Wed 5/7/2025, Normal      naproxen (Naprosyn) 500 mg tablet Take 1 tablet (500 mg total) by mouth 2 (two) times a day with meals for 7 days, Starting Sat 5/3/2025, Until Sat 5/10/2025, Normal      pantoprazole (PROTONIX) 40 mg tablet Take 1 tablet (40 mg total) by mouth daily for 7 days, Starting Sat 5/3/2025, Until Sat 5/10/2025, Normal      polyethylene glycol (MIRALAX) 17 g packet Take 17 g by mouth daily for 7 days, Starting Sat 5/3/2025, Until Sat 5/10/2025, Normal           CONTINUE these medications which have NOT CHANGED    Details   albuterol (PROVENTIL HFA,VENTOLIN HFA) 90 mcg/act inhaler Inhale 1 puff every 4 (four) hours as needed , Starting Fri 5/31/2019, Historical Med      CHANTIX STARTING MONTH ABDIRASHID 0.5 MG X 11 & 1 MG X 42 tablet Take by mouth 2 (two) times a day , Starting Fri 5/31/2019, Historical Med      gabapentin (NEURONTIN) 300 mg capsule Take 300 mg by mouth 3 (three) times a day , Starting Mon 5/20/2019,  Historical Med      multivitamin (THERAGRAN) TABS Take 1 tablet by mouth daily, Historical Med      ondansetron (ZOFRAN) 8 mg tablet Take 1 tablet (8 mg total) by mouth every 8 (eight) hours as needed for nausea or vomiting, Starting Sun 8/25/2019, Normal      pramipexole (MIRAPEX) 1 mg tablet Take 1 mg by mouth daily at bedtime , Starting Mon 5/20/2019, Historical Med             ED SEPSIS DOCUMENTATION   Time reflects when diagnosis was documented in both MDM as applicable and the Disposition within this note       Time User Action Codes Description Comment    5/3/2025  4:17 PM Ana Estrada [M54.31] Sciatica of right side     5/3/2025  4:17 PM Ana Estrada [M54.9] Back pain                  Ana Estrada PA-C  05/03/25 [id number]

## 2025-05-03 NOTE — DISCHARGE INSTRUCTIONS
Please follow-up with your primary care provider as well as your neurologist and the comprehensive spine program, referral has been placed for you.  Please discontinue Flexeril and take Robaxin.  Please do not drink, drive, operate heavy machinery or participate in similar activities while taking this medication.  Naproxen is also being sent to your pharmacy, as it can oftentimes be tough in the stomach, take the Protonix that has been sent to the pharmacy as well.    Please return to the emergency department if you are experiencing worsening back pain, if you develop numbness, weakness, tremors, decrease sensation, difficulty walking, changes in your urinary or bowel habits, fever, chills or any new or worsening symptoms.

## 2025-05-05 ENCOUNTER — NURSE TRIAGE (OUTPATIENT)
Dept: PHYSICAL THERAPY | Facility: OTHER | Age: 55
End: 2025-05-05

## 2025-05-05 DIAGNOSIS — G89.29 ACUTE EXACERBATION OF CHRONIC LOW BACK PAIN: Primary | ICD-10-CM

## 2025-05-05 DIAGNOSIS — M54.50 ACUTE EXACERBATION OF CHRONIC LOW BACK PAIN: Primary | ICD-10-CM

## 2025-05-05 NOTE — TELEPHONE ENCOUNTER
Additional Information   Negative: Is this related to a work injury?   Negative: Is this related to an MVA?   Negative: Are you currently recieving homecare services?   Negative: Has the patient had unexplained weight loss?   Negative: Does the patient have a fever?   Negative: Is the patient experiencing urine retention?   Negative: Is the patient experiencing acute drop foot or paralysis?   Negative: Has the patient experienced major trauma? (fall from height, high speed collision, direct blow to spine) and is also experiencing nausea, light-headedness, or loss of consciousness?   Negative: Is the patient experiencing blood in sputum?   Affirmative: Is this a chronic condition?    Background - Initial Assessment  Clinical complaint: right lower back pain radiating to the buttock leg and foot. With numbness and tingling. States ah history of back pain. States her back pain was improved after the surgery.States this episode of pain started 2 weeks ago with NKI.  Date of onset: 2 weeks  Frequency of pain: constant  Quality of pain: burning, sharp, and stabbing    Protocols used: [facility] Protocol    [facility] was reviewed in detail and what we can provide for their back pain.  Patient is agreeable to being triaged and would like to proceed with Physical Therapy.    Referral was placed for Physical Therapy at the [place] site. Patients information was sent to the  to make evaluation appointment. Patient made aware that the PT office  will be calling to schedule the appointment.  Patient was provided with the phone number to the PT office.    No further questions and/or concerns were voiced by the patient at this time. Patient states understanding of the referral that was placed.    Referral Closed.

## 2025-05-07 ENCOUNTER — EVALUATION (OUTPATIENT)
Dept: PHYSICAL THERAPY | Facility: CLINIC | Age: 55
End: 2025-05-07
Attending: PHYSICAL THERAPIST
Payer: COMMERCIAL

## 2025-05-07 VITALS — SYSTOLIC BLOOD PRESSURE: 130 MMHG | DIASTOLIC BLOOD PRESSURE: 75 MMHG

## 2025-05-07 DIAGNOSIS — M54.50 ACUTE EXACERBATION OF CHRONIC LOW BACK PAIN: ICD-10-CM

## 2025-05-07 DIAGNOSIS — G89.29 ACUTE EXACERBATION OF CHRONIC LOW BACK PAIN: ICD-10-CM

## 2025-05-07 PROCEDURE — 97110 THERAPEUTIC EXERCISES: CPT | Performed by: PHYSICAL THERAPIST

## 2025-05-07 PROCEDURE — 97530 THERAPEUTIC ACTIVITIES: CPT | Performed by: PHYSICAL THERAPIST

## 2025-05-07 PROCEDURE — 97161 PT EVAL LOW COMPLEX 20 MIN: CPT | Performed by: PHYSICAL THERAPIST

## 2025-05-07 NOTE — PROGRESS NOTES
"PT Evaluation     Today's date: 2025  Patient name: Crystal Anders  : 1970  MRN: [id number]  Referring provider: Tacho Blood PT  Dx:   Encounter Diagnosis     ICD-10-CM    1. Acute exacerbation of chronic low back pain  M54.50 Ambulatory referral to PT spine    G89.29             Assessment  Impairments: abnormal or restricted ROM, activity intolerance, impaired physical strength, lacks appropriate home exercise program and pain with function  Symptom irritability: high    Assessment details: Crystal Anders is a 54 y.o. female presenting to outpatient physical therapy at [facility] with complaints of R side low back pain, stiffness and weakness into RLE.  She presents with decreased range of motion, decreased strength, limited flexibility, poor postural awareness, poor body mechanics, altered gait pattern, poor balance, decreased tolerance to activity and decreased functional mobility due to Acute exacerbation of chronic low back pain.  She would benefit from skilled PT services in order to address these deficits and reach maximum level of function.  Thank you for the referral!    Understanding of Dx/Px/POC: fair     Prognosis: fair    Goals  Independently manage sxs with HEP  - MET    Plan  Patient would benefit from: skilled physical therapy    Planned therapy interventions: manual therapy, neuromuscular re-education, therapeutic exercise and therapeutic activities    Treatment plan discussed with: patient  Plan details: X1 visit for HEP instruction and demo - pt reports improved sxs following IE today and agrees to trial independent management of sxs - states she already scheduled imaging       Subjective Evaluation    History of Present Illness  Mechanism of injury: IRA: insidious onset 2 weeks ago after waking up, no knowledge of etiology, thinks she is just \"getting old\" pain has been non stop ever since.     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  Pain  Current pain ratin  At best pain ratin  At worst pain rating: 10  Location: right side of low back into buttocks and down to foot  Quality: burning and sharp  Relieving factors: rest, ice and heat  Aggravating factors: walking  Progression: no change      Diagnostic Tests  No diagnostic tests performed  Treatments  Previous treatment: medication    Objective     Neurological Testing     Sensation     Lumbar   Left   Intact: light touch    Right   Diminished: light touch    Comments   Right light touch: L4    Reflexes   Left   Patellar (L4): normal (2+)  Achilles (S1): normal (2+)    Right   Patellar (L4): absent (0)  Achilles (S1): trace (1+)  Mechanical Assessment    Cervical      Thoracic      Lumbar    Standing flexion: repeated movements   Pain location:peripheralized  Pain intensity: worse    Strength/Myotome Testing     Lumbar   Left   Heel walk: normal  Toe walk: normal    Right   Heel walk: normal  Toe walk: normal    Left Hip   Planes of Motion   Flexion: 5  Extension: 5  Abduction: 5  Adduction: 5  External rotation: 5  Internal rotation: 5    Right Hip   Planes of Motion   Flexion: 5  Extension: 4+  Abduction: 5  Adduction: 5  Internal rotation: 5    Left Knee   Flexion: 5  Extension: 5    Right Knee   Flexion: 5  Extension: 5    Left Ankle/Foot   Dorsiflexion: 5  Great toe extension: 5    Right Ankle/Foot   Dorsiflexion: 4-  Plantar flexion: 5  Great toe extension: 4+    Additional Strength Details  Bridge isometric: 45 sec no pain    Tests  "    Lumbar   Positive Valsalva.     Left   Negative passive SLR and slump test.     Right   Positive passive SLR and slump test.     Left Hip   Negative MIKE.     Right Hip   Positive MIKE.             Daily Treatment Diary     POC Expires Reeval for Medicare to be completed  Unit Limit Auth Expiration Date PT/OT/STVisit Limit   5/7/2025 na na 5/5/2026 1                       Auth Status DATE 5/7        Pending Visit # 1         Remaining 0        MANUAL THERAPY                                                               THERAPEUTIC EXERCISE HEP                                                                                                                                                               NEUROMUSCULAR REEDUCATION           Sciatic n. glide  20x3\" (x2)        Crossed trunk rotation stretch  R over L 20x5\"  (x2)                                                                                                                                THERAPEUTIC ACTIVITY          Patient education: pathoanatomy, nature of sxs, POC, HEP  NS                                      GAIT TRAINING                                                  MODALITIES                                         "

## 2025-06-13 ENCOUNTER — OFFICE VISIT (OUTPATIENT)
Age: 55
End: 2025-06-13
Payer: COMMERCIAL

## 2025-06-13 VITALS
SYSTOLIC BLOOD PRESSURE: 126 MMHG | RESPIRATION RATE: 17 BRPM | DIASTOLIC BLOOD PRESSURE: 80 MMHG | HEART RATE: 104 BPM | TEMPERATURE: 98.1 F | HEIGHT: 64 IN | OXYGEN SATURATION: 99 % | BODY MASS INDEX: 15.98 KG/M2 | WEIGHT: 93.6 LBS

## 2025-06-13 DIAGNOSIS — M54.50 LOWER BACK PAIN: Primary | ICD-10-CM

## 2025-06-13 PROCEDURE — 99204 OFFICE O/P NEW MOD 45 MIN: CPT | Performed by: SPECIALIST

## 2025-06-13 RX ORDER — CELECOXIB 200 MG/1
1 CAPSULE ORAL DAILY
COMMUNITY
Start: 2025-04-10

## 2025-06-13 RX ORDER — IBUPROFEN 800 MG/1
TABLET, FILM COATED ORAL
COMMUNITY
Start: 2025-04-13

## 2025-06-13 RX ORDER — HYDROCODONE BITARTRATE AND ACETAMINOPHEN 5; 325 MG/1; MG/1
TABLET ORAL
COMMUNITY
Start: 2025-06-09

## 2025-06-13 NOTE — PROGRESS NOTES
Name: Crystal Anders      : 1970      MRN: [id number]  Encounter Provider: Suyapa Wilkinson MD  Encounter Date: 2025   Encounter department: [facility] [place]  :  Assessment & Plan      Assessment & Plan  1. Back pain.  Reports experiencing back pain radiating down the left leg, accompanied by numbness and weakness. Pain began abruptly two months ago without any specific injury or fall. Physical therapy was attempted but worsened symptoms. Vicodin prescribed by the family doctor provides minimal relief. MRI was conducted. Advised to try pain management injections first, as recommended by the nurse practitioner at [facility] as well as Dr. Curtis, her neurosurgon. Given history of two back surgeries and presence of scar tissue, surgical intervention should be considered only if other treatments fail. Referral to [facility] made for further evaluation and treatment. If pain management injections are ineffective, surgical options will be reconsidered.She will have surgery with Dr. Curtis, who she is familiar with and has already been in contact with.     Ahmet Wilkinson        History of Present Illness       History of Present Illness  The patient presents for back pain. She is accompanied by her sister.    Approximately 9 years ago, she underwent her most recent back surgery at [facility], which initially resulted in significant improvement. However, over the past 2 months, she has experienced a resurgence of pain, which she describes as sudden onset and progressively worsening. The pain is localized in her back and radiates down her left leg, accompanied by a sensation of weakness. Additionally, she reports numbness in the front and sides of her left leg. There have been no recent falls or injuries contributing to her symptoms.    She has not yet consulted with Dr. Dial. Her primary care physician, Dr. Hung Muir,  referred her to our clinic due to significant weight loss and vomiting, which she attributes to the severity of her pain. Despite undergoing physical therapy for 6 weeks, her symptoms have not improved. A nurse practitioner at the neurosurgery department ordered an MRI, but she has not received any follow-up regarding the results. She was informed that an injection would be administered to manage her pain, but this would not address the underlying issue. She expressed a preference for a more permanent solution. Her family doctor prescribed Vicodin, which provides some relief.      HPI     Review of Systems   Constitutional:  Positive for activity change and appetite change.   HENT:  Negative for trouble swallowing.    Gastrointestinal:  Positive for vomiting (due to pain).   Genitourinary:  Negative for enuresis.        Painful to sit when urinating    Musculoskeletal:  Positive for back pain (left sided lbp into left hip, buttock, and leg). Negative for gait problem (walks slow) and myalgias.        Ongoing for 2 months, unprovoked    Neurological:  Positive for weakness (BLE, left worse than right) and numbness (left shin and foot n+t).   Hematological:  Does not bruise/bleed easily.   Psychiatric/Behavioral:  Positive for sleep disturbance.    All other systems reviewed and are negative.    I have personally reviewed the MA's review of systems and made changes as necessary.    Past Medical History   Past Medical History[1]  Past Surgical History[2]  Family History[3]  she reports that she has been smoking cigarettes. She started smoking about 35 years ago. She has a 30 pack-year smoking history. She has never used smokeless tobacco. She reports current alcohol use. She reports that she does not use drugs.  Current Outpatient Medications   Medication Instructions    albuterol (PROVENTIL HFA,VENTOLIN HFA) 90 mcg/act inhaler 1 puff, Every 4 hours PRN    celecoxib (CeleBREX) 200 mg capsule 1 capsule, Daily    CHANTIX  "STARTING MONTH ABDIRASHID 0.5 MG X 11 & 1 MG X 42 tablet 2 times daily    gabapentin (NEURONTIN) 300 mg, 3 times daily    HYDROcodone-acetaminophen (NORCO) 5-325 mg per tablet TAKE 1 TABLET BY MOUTH EVERY 4 TO 6 HOURS FOR ACUTE PAIN    ibuprofen (MOTRIN) 800 mg tablet TAKE 1 TABLET BY MOUTH THREE TIMES A DAY WITH FOOD OR MILK    methocarbamol (ROBAXIN) 500 mg, Oral, 2 times daily    multivitamin (THERAGRAN) TABS 1 tablet, Daily    naproxen (NAPROSYN) 500 mg, Oral, 2 times daily with meals    ondansetron (ZOFRAN) 8 mg, Oral, Every 8 hours PRN    pantoprazole (PROTONIX) 40 mg, Oral, Daily    polyethylene glycol (MIRALAX) 17 g, Oral, Daily    pramipexole (MIRAPEX) 1 mg, Daily at bedtime   Allergies[4]     Mri lumbar spine  5/19/25    L3-L4 left paracentral disc herniation with mild inferior migration resulting in   asymmetric severe narrowing of the left subarticular zone and suspected   impingement/mass effect upon the left L4 descending nerve root. Mild to moderate   spinal canal stenosis at this level. Correlation with clinical symptomatology is   advised.     Prior posterior lumbar spinal fusion/instrumentation with decompressive   laminectomies at L4-5 and L5-S1. Small amount of enhancing epidural scar tissue   at these levels.     Objective   Resp 17   Ht 5' 4\" (1.626 m)   Wt 42.5 kg (93 lb 9.6 oz)   BMI 16.07 kg/m²     Physical Exam  Neurological Exam  HENT:   Head: Normocephalic and atraumatic.   Right Ear: External ear normal.   Left Ear: External ear normal.   Eyes:   General:   Right eye: No discharge.   Left eye: No discharge.   Conjunctiva/sclera: Conjunctivae normal.   Neck:   Trachea: Trachea normal. No tracheal deviation.   Cardiovascular:   Rate and Rhythm: Normal rate.   Pulmonary:   Effort: Pulmonary effort is normal. No respiratory distress.   Abdominal:   Palpations: Abdomen is soft.   Musculoskeletal:   General: Tenderness present.   Cervical back: Neck supple.   Lumbar back: Positive right straight " leg raise test and positive left straight leg raise test.   Neurological:   Mental Status: She is alert and oriented to person, place, and time.   Skin:  General: Skin is warm and dry.   Findings: No rash.   Psychiatric:   Mood and Affect: Mood and affect normal. Affect is tearful.   Speech: Speech normal.   Behavior: Behavior normal.         Neurological Exam  Mental Status  Alert. Oriented to person, place, and time. Speech is normal.    Motor    4+/5 left ankle dorsiflexion. Otherwise 5/5 bilateral lower extremities.    Sensory  Sensation grossly intact to light touch right lower extremity. Somewhat decreased sensation to light touch left lower extremity, roughly along L4 and L5 dermatomes..    Reflexes    Right pathological reflexes: Ankle clonus absent.  Left pathological reflexes: Ankle clonus absent.    Gait    Antalgic gait.                   [1]   Past Medical History:  Diagnosis Date    Bronchitis     Neuropathy     PONV (postoperative nausea and vomiting)     Restless leg syndrome    [2]   Past Surgical History:  Procedure Laterality Date    BACK SURGERY      Lumbar X2    CERVICAL FUSION       SECTION      X1    CHOLECYSTECTOMY      COLONOSCOPY      CYST REMOVAL      Scalp, lorna middle fingers    FOOT SURGERY Left     X2    HYSTERECTOMY      MI ARTHRODESIS GREAT TOE METATARSOPHALANGEAL JOINT Left 2019    Procedure: FOOT 1ST MTPJ ARTHRODESIS / FUSION;  Surgeon: Blayne Wick DPM;  Location: [facility];  Service: Podiatry    MI REMOVAL IMPLANT DEEP Left 2019    Procedure: FOOT GREAT TOE REMOVAL OF HARDWARE;  Surgeon: Blayne Wick DPM;  Location: [facility];  Service: Podiatry    TONSILLECTOMY      and adeniodectomy   [3] No family history on file.  [4]   Allergies  Allergen Reactions    Cephalexin Hives    Codeine Other (See Comments) and Dizziness       restless    Metoclopramide Other (See Comments)     muscle spasms

## (undated) DEVICE — PADDING CAST 4 IN  COTTON STRL

## (undated) DEVICE — GLOVE SRG BIOGEL 7.5

## (undated) DEVICE — STRETCH BANDAGE: Brand: CURITY

## (undated) DEVICE — THE SIMPULSE SOLO SYSTEM WITH ULTREX RETRACTABLE SPLASH SHIELD TIP: Brand: SIMPULSE SOLO

## (undated) DEVICE — PREP SURGICAL PURPREP 26ML

## (undated) DEVICE — KERLIX BANDAGE ROLL: Brand: KERLIX

## (undated) DEVICE — WEBRIL 6 IN UNSTERILE

## (undated) DEVICE — DRAPE EQUIPMENT RF WAND

## (undated) DEVICE — BLADE SAGITTAL 63.0 X 19.5MM

## (undated) DEVICE — TUBING SUCTION 5MM X 12 FT

## (undated) DEVICE — ARTHREX WIRE 0.062 IN ORTHO TROC TIP

## (undated) DEVICE — INTENDED FOR TISSUE SEPARATION, AND OTHER PROCEDURES THAT REQUIRE A SHARP SURGICAL BLADE TO PUNCTURE OR CUT.: Brand: BARD-PARKER ® CARBON RIB-BACK BLADES

## (undated) DEVICE — NEEDLE 18 G X 1 1/2

## (undated) DEVICE — TAPE CAST 4IN FIBERGLASS 4YD WHITE

## (undated) DEVICE — BETHLEHEM UNIVERSAL  MIONR EXT: Brand: CARDINAL HEALTH

## (undated) DEVICE — CURITY NON-ADHERENT STRIPS: Brand: CURITY

## (undated) DEVICE — OCCLUSIVE GAUZE STRIP,3% BISMUTH TRIBROMOPHENATE IN PETROLATUM BLEND: Brand: XEROFORM

## (undated) DEVICE — DRAPE C-ARMOUR

## (undated) DEVICE — SCD SEQUENTIAL COMPRESSION COMFORT SLEEVE MEDIUM KNEE LENGTH: Brand: KENDALL SCD

## (undated) DEVICE — GUIDEWIRE 1.1MM .045 IN TROCAR TIP LASER LINE

## (undated) DEVICE — REAMER METATARSAL 22MM

## (undated) DEVICE — PENCIL ELECTROSURG E-Z CLEAN -0035H

## (undated) DEVICE — SUT VICRYL 3-0 SH 27 IN J416H

## (undated) DEVICE — CURITY STRETCH BANDAGE: Brand: CURITY

## (undated) DEVICE — SUT VICRYL 2-0 SH 27 IN UNDYED J417H

## (undated) DEVICE — GLOVE INDICATOR PI UNDERGLOVE SZ 7.5 BLUE

## (undated) DEVICE — TRAY FOLEY 16FR URIMETER SILICONE SURESTEP

## (undated) DEVICE — SPONGE LAP 18 X 18 IN

## (undated) DEVICE — SUT ETHILON 4-0 PS-2 18 IN 1667H

## (undated) DEVICE — DRAPE C-ARM X-RAY

## (undated) DEVICE — SYRINGE 10ML LL

## (undated) DEVICE — REAMER PHALANGEAL 22MM

## (undated) DEVICE — GAUZE SPONGES,16 PLY: Brand: CURITY

## (undated) DEVICE — NEEDLE 25G X 1 1/2

## (undated) DEVICE — REAMER PHALANGEAL 20MM

## (undated) DEVICE — ACE WRAP 6 IN UNSTERILE

## (undated) DEVICE — CHLORAPREP HI-LITE 26ML ORANGE

## (undated) DEVICE — 10FR FRAZIER SUCTION HANDLE: Brand: CARDINAL HEALTH

## (undated) DEVICE — REAMER METATARSAL 20MM

## (undated) DEVICE — CAST PADDING 4 IN SYNTHETIC NON-STRL

## (undated) DEVICE — PAD CAST 4 IN COTTON NON STERILE

## (undated) DEVICE — ACE WRAP 4 IN UNSTERILE

## (undated) DEVICE — DRILL BIT 2MM CANN

## (undated) DEVICE — 2000CC GUARDIAN II: Brand: GUARDIAN

## (undated) DEVICE — 3M™ DURAPORE™ SURGICAL TAPE 1538-3, 3 INCH X 10 YARD (7,5CM X 9,1M), 4 ROLLS/BOX: Brand: 3M™ DURAPORE™